# Patient Record
Sex: FEMALE | Race: WHITE | NOT HISPANIC OR LATINO | Employment: FULL TIME | URBAN - METROPOLITAN AREA
[De-identification: names, ages, dates, MRNs, and addresses within clinical notes are randomized per-mention and may not be internally consistent; named-entity substitution may affect disease eponyms.]

---

## 2017-03-06 ENCOUNTER — TRANSCRIBE ORDERS (OUTPATIENT)
Dept: MAMMOGRAPHY | Facility: HOSPITAL | Age: 47
End: 2017-03-06

## 2017-03-06 DIAGNOSIS — Z12.31 VISIT FOR SCREENING MAMMOGRAM: Primary | ICD-10-CM

## 2017-03-15 ENCOUNTER — TRANSCRIBE ORDERS (OUTPATIENT)
Dept: ADMINISTRATIVE | Facility: HOSPITAL | Age: 47
End: 2017-03-15

## 2017-03-15 DIAGNOSIS — R10.32 LLQ PAIN: Primary | ICD-10-CM

## 2017-03-28 ENCOUNTER — APPOINTMENT (OUTPATIENT)
Dept: MAMMOGRAPHY | Facility: HOSPITAL | Age: 47
End: 2017-03-28
Attending: OBSTETRICS & GYNECOLOGY

## 2017-04-07 ENCOUNTER — APPOINTMENT (OUTPATIENT)
Dept: CT IMAGING | Facility: HOSPITAL | Age: 47
End: 2017-04-07
Attending: INTERNAL MEDICINE

## 2017-04-21 ENCOUNTER — APPOINTMENT (OUTPATIENT)
Dept: CT IMAGING | Facility: HOSPITAL | Age: 47
End: 2017-04-21
Attending: INTERNAL MEDICINE

## 2017-05-12 ENCOUNTER — HOSPITAL ENCOUNTER (OUTPATIENT)
Dept: CT IMAGING | Facility: HOSPITAL | Age: 47
Discharge: HOME | End: 2017-05-12
Attending: INTERNAL MEDICINE | Admitting: INTERNAL MEDICINE

## 2017-05-12 DIAGNOSIS — R10.32 LLQ PAIN: ICD-10-CM

## 2017-05-12 PROCEDURE — 0 IOPAMIDOL 61 % SOLUTION: Performed by: INTERNAL MEDICINE

## 2017-05-12 PROCEDURE — 74178 CT ABD&PLV WO CNTR FLWD CNTR: CPT

## 2017-05-12 RX ADMIN — IOPAMIDOL 75 ML: 612 INJECTION, SOLUTION INTRAVENOUS at 15:25

## 2017-05-12 RX ADMIN — BARIUM SULFATE 450 ML: 21 SUSPENSION ORAL at 14:00

## 2017-12-14 ENCOUNTER — TRANSCRIBE ORDERS (OUTPATIENT)
Dept: ADMINISTRATIVE | Facility: HOSPITAL | Age: 47
End: 2017-12-14

## 2017-12-14 DIAGNOSIS — G43.119 INTRACTABLE MIGRAINE WITH AURA WITHOUT STATUS MIGRAINOSUS: Primary | ICD-10-CM

## 2018-01-16 ENCOUNTER — HOSPITAL ENCOUNTER (OUTPATIENT)
Dept: MRI IMAGING | Facility: HOSPITAL | Age: 48
Discharge: HOME | End: 2018-01-16
Attending: INTERNAL MEDICINE | Admitting: INTERNAL MEDICINE

## 2018-01-16 DIAGNOSIS — G43.119 INTRACTABLE MIGRAINE WITH AURA WITHOUT STATUS MIGRAINOSUS: ICD-10-CM

## 2018-01-16 PROCEDURE — 70551 MRI BRAIN STEM W/O DYE: CPT

## 2018-03-29 ENCOUNTER — TRANSCRIBE ORDERS (OUTPATIENT)
Dept: LAB | Facility: HOSPITAL | Age: 48
End: 2018-03-29

## 2018-03-29 ENCOUNTER — LAB (OUTPATIENT)
Dept: LAB | Facility: HOSPITAL | Age: 48
End: 2018-03-29
Attending: INTERNAL MEDICINE

## 2018-03-29 DIAGNOSIS — R07.0 THROAT PAIN: Primary | ICD-10-CM

## 2018-03-29 DIAGNOSIS — R07.0 THROAT PAIN: ICD-10-CM

## 2018-03-29 LAB
FLUAV SUBTYP SPEC NAA+PROBE: NOT DETECTED
FLUBV RNA ISLT QL NAA+PROBE: NOT DETECTED
S PYO AG THROAT QL: NEGATIVE

## 2018-03-29 PROCEDURE — 87880 STREP A ASSAY W/OPTIC: CPT

## 2018-03-29 PROCEDURE — 87070 CULTURE OTHR SPECIMN AEROBIC: CPT

## 2018-03-29 PROCEDURE — 87502 INFLUENZA DNA AMP PROBE: CPT

## 2018-03-31 LAB
BACTERIA SPEC AEROBE CULT: NORMAL

## 2018-05-03 ENCOUNTER — APPOINTMENT (OUTPATIENT)
Dept: PREADMISSION TESTING | Facility: HOSPITAL | Age: 48
End: 2018-05-03

## 2018-05-03 ENCOUNTER — LAB (OUTPATIENT)
Dept: LAB | Facility: HOSPITAL | Age: 48
End: 2018-05-03

## 2018-05-03 VITALS — BODY MASS INDEX: 35.08 KG/M2 | HEIGHT: 64 IN | WEIGHT: 205.47 LBS

## 2018-05-03 LAB
BASOPHILS # BLD AUTO: 0.07 10*3/MM3 (ref 0–0.2)
BASOPHILS NFR BLD AUTO: 0.8 % (ref 0–1)
DEPRECATED RDW RBC AUTO: 45.7 FL (ref 37–54)
EOSINOPHIL # BLD AUTO: 0.33 10*3/MM3 (ref 0–0.3)
EOSINOPHIL NFR BLD AUTO: 3.8 % (ref 0–3)
ERYTHROCYTE [DISTWIDTH] IN BLOOD BY AUTOMATED COUNT: 13.7 % (ref 11.3–14.5)
HCT VFR BLD AUTO: 42.1 % (ref 34.5–44)
HGB BLD-MCNC: 14.1 G/DL (ref 11.5–15.5)
IMM GRANULOCYTES # BLD: 0.02 10*3/MM3 (ref 0–0.03)
IMM GRANULOCYTES NFR BLD: 0.2 % (ref 0–0.6)
LYMPHOCYTES # BLD AUTO: 2.29 10*3/MM3 (ref 0.6–4.8)
LYMPHOCYTES NFR BLD AUTO: 26.6 % (ref 24–44)
MCH RBC QN AUTO: 30.8 PG (ref 27–31)
MCHC RBC AUTO-ENTMCNC: 33.5 G/DL (ref 32–36)
MCV RBC AUTO: 91.9 FL (ref 80–99)
MONOCYTES # BLD AUTO: 0.53 10*3/MM3 (ref 0–1)
MONOCYTES NFR BLD AUTO: 6.1 % (ref 0–12)
NEUTROPHILS # BLD AUTO: 5.4 10*3/MM3 (ref 1.5–8.3)
NEUTROPHILS NFR BLD AUTO: 62.7 % (ref 41–71)
PLATELET # BLD AUTO: 290 10*3/MM3 (ref 150–450)
PMV BLD AUTO: 11.1 FL (ref 6–12)
RBC # BLD AUTO: 4.58 10*6/MM3 (ref 3.89–5.14)
WBC NRBC COR # BLD: 8.62 10*3/MM3 (ref 3.5–10.8)

## 2018-05-03 PROCEDURE — 36415 COLL VENOUS BLD VENIPUNCTURE: CPT

## 2018-05-03 PROCEDURE — 85025 COMPLETE CBC W/AUTO DIFF WBC: CPT

## 2018-05-03 RX ORDER — SUMATRIPTAN 100 MG/1
100 TABLET, FILM COATED ORAL ONCE AS NEEDED
COMMUNITY

## 2018-05-03 RX ORDER — METOPROLOL SUCCINATE 100 MG/1
100 TABLET, EXTENDED RELEASE ORAL DAILY
Status: ON HOLD | COMMUNITY
End: 2018-05-08

## 2018-05-03 NOTE — DISCHARGE INSTRUCTIONS
The following information and instructions were given:    NPO after MN except sips of water with routine prescribed medication (except blood thinner, diabetes, or weight reducing medication) unless otherwise instructed by your physician.  Do not eat, drink, smoke or chew gum after midnight the night before surgery. This also includes no mints.    DO NOT shave for two days before your procedure.  Do not wear makeup.      DO NOT wear fingernail polish (gel/regular) and/or acrylic/artificial nails on the day of surgery.   If a patient had recent manicure and would rather not remove polish or artificial nails, then the minimum requirement is that the polish/artificial nails must be removed from the middle finger on each hand.      If patient is having surgery/procedure on an upper extremity, then the patient was instructed that fingernail polish/artificial fingernails must be removed for surgery.  NO EXCEPTIONS.      If patient is having surgery/procedure on a lower extremity, then the patient was instructed that toenail polish on both extremities must be removed for surgery.  NO EXCEPTIONS.    Remove all jewelry (advised to go to jeweler if unable to remove).  Jewelry, especially rings, can no longer be taped for surgery.    Leave anything you consider valuable at home.    Leave your suitcase in the car until after your surgery.    Bring the following with you (if applicable)       -picture ID and insurance cards       -Co-pay/deductible required by insurance       -Medications in the original bottles (not a list) including all over-the-counter  medications if not brought to PAT       -Copy of advance directive, living will or power of  documents if not  brought to [facility]       -CPAP or BIPAP mask and tubing (do not bring machine)       -Skin prep instructions sheet       -PAT Pass    Education booklet, brochure, handout or binder given to patient.    Pain Control After Surgery handout given to  patient.    Respirex use (handout given to patient) and pneumonia prevention.    Signs and Symptoms of infection discussed.    DVT Prevention education given.  Stressing the importance of ambulation.    Patient to apply Chlorhexadine wipes to surgical area (as instructed) the night before procedure and the AM of procedure.

## 2018-05-07 ENCOUNTER — ANESTHESIA EVENT (OUTPATIENT)
Dept: PERIOP | Facility: HOSPITAL | Age: 48
End: 2018-05-07

## 2018-05-08 ENCOUNTER — ANESTHESIA (OUTPATIENT)
Dept: PERIOP | Facility: HOSPITAL | Age: 48
End: 2018-05-08

## 2018-05-08 ENCOUNTER — HOSPITAL ENCOUNTER (OUTPATIENT)
Facility: HOSPITAL | Age: 48
Setting detail: HOSPITAL OUTPATIENT SURGERY
Discharge: HOME | End: 2018-05-08
Attending: PLASTIC SURGERY | Admitting: PLASTIC SURGERY

## 2018-05-08 VITALS
TEMPERATURE: 97.8 F | SYSTOLIC BLOOD PRESSURE: 140 MMHG | DIASTOLIC BLOOD PRESSURE: 88 MMHG | HEART RATE: 56 BPM | RESPIRATION RATE: 18 BRPM | OXYGEN SATURATION: 97 %

## 2018-05-08 DIAGNOSIS — T85.44XA BREAST IMPLANT CAPSULAR CONTRACTURE: ICD-10-CM

## 2018-05-08 LAB
B-HCG UR QL: NEGATIVE
INTERNAL NEGATIVE CONTROL: NORMAL
INTERNAL POSITIVE CONTROL: REACTIVE
Lab: NORMAL

## 2018-05-08 PROCEDURE — 25010000002 FENTANYL CITRATE (PF) 100 MCG/2ML SOLUTION: Performed by: NURSE ANESTHETIST, CERTIFIED REGISTERED

## 2018-05-08 PROCEDURE — 25010000002 ONDANSETRON PER 1 MG: Performed by: NURSE ANESTHETIST, CERTIFIED REGISTERED

## 2018-05-08 PROCEDURE — 25010000002 PROPOFOL 10 MG/ML EMULSION: Performed by: NURSE ANESTHETIST, CERTIFIED REGISTERED

## 2018-05-08 PROCEDURE — 25010000003 CEFAZOLIN IN DEXTROSE 2-4 GM/100ML-% SOLUTION: Performed by: PLASTIC SURGERY

## 2018-05-08 PROCEDURE — 25010000002 DEXAMETHASONE PER 1 MG: Performed by: NURSE ANESTHETIST, CERTIFIED REGISTERED

## 2018-05-08 PROCEDURE — 25010000002 PROPOFOL 1000 MG/ML EMULSION: Performed by: NURSE ANESTHETIST, CERTIFIED REGISTERED

## 2018-05-08 PROCEDURE — 88305 TISSUE EXAM BY PATHOLOGIST: CPT | Performed by: PLASTIC SURGERY

## 2018-05-08 PROCEDURE — 25010000002 NEOSTIGMINE 10 MG/10ML SOLUTION: Performed by: NURSE ANESTHETIST, CERTIFIED REGISTERED

## 2018-05-08 PROCEDURE — 25010000002 HYDROMORPHONE PER 4 MG: Performed by: NURSE ANESTHETIST, CERTIFIED REGISTERED

## 2018-05-08 RX ORDER — MAGNESIUM HYDROXIDE 1200 MG/15ML
LIQUID ORAL AS NEEDED
Status: DISCONTINUED | OUTPATIENT
Start: 2018-05-08 | End: 2018-05-08 | Stop reason: HOSPADM

## 2018-05-08 RX ORDER — GINSENG 100 MG
CAPSULE ORAL AS NEEDED
Status: DISCONTINUED | OUTPATIENT
Start: 2018-05-08 | End: 2018-05-08 | Stop reason: HOSPADM

## 2018-05-08 RX ORDER — FENTANYL CITRATE 50 UG/ML
INJECTION, SOLUTION INTRAMUSCULAR; INTRAVENOUS AS NEEDED
Status: DISCONTINUED | OUTPATIENT
Start: 2018-05-08 | End: 2018-05-08 | Stop reason: SURG

## 2018-05-08 RX ORDER — NARATRIPTAN 1 MG/1
1 TABLET ORAL ONCE AS NEEDED
COMMUNITY

## 2018-05-08 RX ORDER — NEOSTIGMINE METHYLSULFATE 1 MG/ML
INJECTION, SOLUTION INTRAVENOUS AS NEEDED
Status: DISCONTINUED | OUTPATIENT
Start: 2018-05-08 | End: 2018-05-08 | Stop reason: SURG

## 2018-05-08 RX ORDER — PROMETHAZINE HYDROCHLORIDE 25 MG/1
25 SUPPOSITORY RECTAL ONCE AS NEEDED
Status: DISCONTINUED | OUTPATIENT
Start: 2018-05-08 | End: 2018-05-09 | Stop reason: HOSPADM

## 2018-05-08 RX ORDER — GLYCOPYRROLATE 0.2 MG/ML
INJECTION INTRAMUSCULAR; INTRAVENOUS AS NEEDED
Status: DISCONTINUED | OUTPATIENT
Start: 2018-05-08 | End: 2018-05-08 | Stop reason: SURG

## 2018-05-08 RX ORDER — SODIUM CHLORIDE, SODIUM LACTATE, POTASSIUM CHLORIDE, CALCIUM CHLORIDE 600; 310; 30; 20 MG/100ML; MG/100ML; MG/100ML; MG/100ML
9 INJECTION, SOLUTION INTRAVENOUS CONTINUOUS
Status: DISCONTINUED | OUTPATIENT
Start: 2018-05-08 | End: 2018-05-09 | Stop reason: HOSPADM

## 2018-05-08 RX ORDER — ROCURONIUM BROMIDE 10 MG/ML
INJECTION, SOLUTION INTRAVENOUS AS NEEDED
Status: DISCONTINUED | OUTPATIENT
Start: 2018-05-08 | End: 2018-05-08 | Stop reason: SURG

## 2018-05-08 RX ORDER — PROMETHAZINE HYDROCHLORIDE 12.5 MG/1
12.5 TABLET ORAL EVERY 6 HOURS PRN
Qty: 15 TABLET | Refills: 0 | Status: SHIPPED | OUTPATIENT
Start: 2018-05-08 | End: 2019-12-02

## 2018-05-08 RX ORDER — ONDANSETRON 2 MG/ML
4 INJECTION INTRAMUSCULAR; INTRAVENOUS ONCE AS NEEDED
Status: DISCONTINUED | OUTPATIENT
Start: 2018-05-08 | End: 2018-05-09 | Stop reason: HOSPADM

## 2018-05-08 RX ORDER — ONDANSETRON 2 MG/ML
INJECTION INTRAMUSCULAR; INTRAVENOUS AS NEEDED
Status: DISCONTINUED | OUTPATIENT
Start: 2018-05-08 | End: 2018-05-08 | Stop reason: SURG

## 2018-05-08 RX ORDER — LIDOCAINE HYDROCHLORIDE 10 MG/ML
0.5 INJECTION, SOLUTION EPIDURAL; INFILTRATION; INTRACAUDAL; PERINEURAL ONCE AS NEEDED
Status: COMPLETED | OUTPATIENT
Start: 2018-05-08 | End: 2018-05-08

## 2018-05-08 RX ORDER — TOPIRAMATE 100 MG/1
100 TABLET, FILM COATED ORAL NIGHTLY
COMMUNITY

## 2018-05-08 RX ORDER — FENTANYL CITRATE 50 UG/ML
50 INJECTION, SOLUTION INTRAMUSCULAR; INTRAVENOUS
Status: DISCONTINUED | OUTPATIENT
Start: 2018-05-08 | End: 2018-05-09 | Stop reason: HOSPADM

## 2018-05-08 RX ORDER — PROMETHAZINE HYDROCHLORIDE 25 MG/ML
12.5 INJECTION, SOLUTION INTRAMUSCULAR; INTRAVENOUS ONCE AS NEEDED
Status: DISCONTINUED | OUTPATIENT
Start: 2018-05-08 | End: 2018-05-09 | Stop reason: HOSPADM

## 2018-05-08 RX ORDER — CEFAZOLIN SODIUM 2 G/100ML
2 INJECTION, SOLUTION INTRAVENOUS ONCE
Status: COMPLETED | OUTPATIENT
Start: 2018-05-08 | End: 2018-05-08

## 2018-05-08 RX ORDER — DEXAMETHASONE SODIUM PHOSPHATE 4 MG/ML
INJECTION, SOLUTION INTRA-ARTICULAR; INTRALESIONAL; INTRAMUSCULAR; INTRAVENOUS; SOFT TISSUE AS NEEDED
Status: DISCONTINUED | OUTPATIENT
Start: 2018-05-08 | End: 2018-05-08 | Stop reason: SURG

## 2018-05-08 RX ORDER — SODIUM CHLORIDE 0.9 % (FLUSH) 0.9 %
1-10 SYRINGE (ML) INJECTION AS NEEDED
Status: DISCONTINUED | OUTPATIENT
Start: 2018-05-08 | End: 2018-05-08 | Stop reason: HOSPADM

## 2018-05-08 RX ORDER — LIDOCAINE HYDROCHLORIDE AND EPINEPHRINE 5; 5 MG/ML; UG/ML
INJECTION, SOLUTION INFILTRATION; PERINEURAL AS NEEDED
Status: DISCONTINUED | OUTPATIENT
Start: 2018-05-08 | End: 2018-05-08 | Stop reason: HOSPADM

## 2018-05-08 RX ORDER — SCOLOPAMINE TRANSDERMAL SYSTEM 1 MG/1
1 PATCH, EXTENDED RELEASE TRANSDERMAL
Status: DISCONTINUED | OUTPATIENT
Start: 2018-05-08 | End: 2018-05-08 | Stop reason: HOSPADM

## 2018-05-08 RX ORDER — OXYCODONE HYDROCHLORIDE AND ACETAMINOPHEN 5; 325 MG/1; MG/1
1 TABLET ORAL EVERY 4 HOURS PRN
Qty: 20 TABLET | Refills: 0 | Status: SHIPPED | OUTPATIENT
Start: 2018-05-08 | End: 2019-10-09

## 2018-05-08 RX ORDER — PROMETHAZINE HYDROCHLORIDE 25 MG/1
25 TABLET ORAL ONCE AS NEEDED
Status: DISCONTINUED | OUTPATIENT
Start: 2018-05-08 | End: 2018-05-09 | Stop reason: HOSPADM

## 2018-05-08 RX ORDER — LIDOCAINE HYDROCHLORIDE 10 MG/ML
INJECTION, SOLUTION EPIDURAL; INFILTRATION; INTRACAUDAL; PERINEURAL AS NEEDED
Status: DISCONTINUED | OUTPATIENT
Start: 2018-05-08 | End: 2018-05-08 | Stop reason: SURG

## 2018-05-08 RX ORDER — OXYCODONE HYDROCHLORIDE AND ACETAMINOPHEN 5; 325 MG/1; MG/1
1 TABLET ORAL ONCE AS NEEDED
Status: COMPLETED | OUTPATIENT
Start: 2018-05-08 | End: 2018-05-08

## 2018-05-08 RX ORDER — FAMOTIDINE 20 MG/1
20 TABLET, FILM COATED ORAL ONCE
Status: COMPLETED | OUTPATIENT
Start: 2018-05-08 | End: 2018-05-08

## 2018-05-08 RX ORDER — ATRACURIUM BESYLATE 10 MG/ML
INJECTION, SOLUTION INTRAVENOUS AS NEEDED
Status: DISCONTINUED | OUTPATIENT
Start: 2018-05-08 | End: 2018-05-08 | Stop reason: SURG

## 2018-05-08 RX ORDER — TIZANIDINE 4 MG/1
2 TABLET ORAL NIGHTLY PRN
COMMUNITY
End: 2019-10-09

## 2018-05-08 RX ORDER — HYDROMORPHONE HYDROCHLORIDE 1 MG/ML
0.5 INJECTION, SOLUTION INTRAMUSCULAR; INTRAVENOUS; SUBCUTANEOUS
Status: DISCONTINUED | OUTPATIENT
Start: 2018-05-08 | End: 2018-05-09 | Stop reason: HOSPADM

## 2018-05-08 RX ORDER — PROPOFOL 10 MG/ML
VIAL (ML) INTRAVENOUS AS NEEDED
Status: DISCONTINUED | OUTPATIENT
Start: 2018-05-08 | End: 2018-05-08 | Stop reason: SURG

## 2018-05-08 RX ADMIN — DEXAMETHASONE SODIUM PHOSPHATE 8 MG: 4 INJECTION, SOLUTION INTRAMUSCULAR; INTRAVENOUS at 07:55

## 2018-05-08 RX ADMIN — FAMOTIDINE 20 MG: 20 TABLET ORAL at 06:42

## 2018-05-08 RX ADMIN — SCOPALAMINE 1 PATCH: 1 PATCH, EXTENDED RELEASE TRANSDERMAL at 06:42

## 2018-05-08 RX ADMIN — PROPOFOL 160 MG: 10 INJECTION, EMULSION INTRAVENOUS at 07:45

## 2018-05-08 RX ADMIN — ROCURONIUM BROMIDE 35 MG: 10 SOLUTION INTRAVENOUS at 07:45

## 2018-05-08 RX ADMIN — OXYCODONE HYDROCHLORIDE AND ACETAMINOPHEN 1 TABLET: 5; 325 TABLET ORAL at 11:03

## 2018-05-08 RX ADMIN — FENTANYL CITRATE 50 MCG: 50 INJECTION, SOLUTION INTRAMUSCULAR; INTRAVENOUS at 10:03

## 2018-05-08 RX ADMIN — PROPOFOL 50 MG: 10 INJECTION, EMULSION INTRAVENOUS at 07:53

## 2018-05-08 RX ADMIN — NEOSTIGMINE METHYLSULFATE 2.5 MG: 1 INJECTION, SOLUTION INTRAVENOUS at 09:59

## 2018-05-08 RX ADMIN — GLYCOPYRROLATE 0.4 MG: 0.2 INJECTION, SOLUTION INTRAMUSCULAR; INTRAVENOUS at 09:59

## 2018-05-08 RX ADMIN — HYDROMORPHONE HYDROCHLORIDE 0.5 MG: 1 INJECTION, SOLUTION INTRAMUSCULAR; INTRAVENOUS; SUBCUTANEOUS at 11:07

## 2018-05-08 RX ADMIN — FENTANYL CITRATE 50 MCG: 50 INJECTION, SOLUTION INTRAMUSCULAR; INTRAVENOUS at 07:45

## 2018-05-08 RX ADMIN — CEFAZOLIN SODIUM 2 G: 2 INJECTION, SOLUTION INTRAVENOUS at 07:38

## 2018-05-08 RX ADMIN — SODIUM CHLORIDE, POTASSIUM CHLORIDE, SODIUM LACTATE AND CALCIUM CHLORIDE 9 ML/HR: 600; 310; 30; 20 INJECTION, SOLUTION INTRAVENOUS at 06:40

## 2018-05-08 RX ADMIN — LIDOCAINE HYDROCHLORIDE 0.5 ML: 10 INJECTION, SOLUTION EPIDURAL; INFILTRATION; INTRACAUDAL; PERINEURAL at 06:40

## 2018-05-08 RX ADMIN — ONDANSETRON 4 MG: 2 INJECTION INTRAMUSCULAR; INTRAVENOUS at 09:59

## 2018-05-08 RX ADMIN — PROPOFOL 25 MCG/KG/MIN: 10 INJECTION, EMULSION INTRAVENOUS at 07:53

## 2018-05-08 RX ADMIN — ATRACURIUM BESYLATE 10 MG: 10 INJECTION, SOLUTION INTRAVENOUS at 09:26

## 2018-05-08 RX ADMIN — LIDOCAINE HYDROCHLORIDE 50 MG: 10 INJECTION, SOLUTION EPIDURAL; INFILTRATION; INTRACAUDAL; PERINEURAL at 07:45

## 2018-05-08 RX ADMIN — HYDROMORPHONE HYDROCHLORIDE 0.5 MG: 1 INJECTION, SOLUTION INTRAMUSCULAR; INTRAVENOUS; SUBCUTANEOUS at 10:37

## 2018-05-08 NOTE — ANESTHESIA PREPROCEDURE EVALUATION
Anesthesia Evaluation     Patient summary reviewed and Nursing notes reviewed                Airway   Mallampati: II  Dental      Pulmonary - negative pulmonary ROS   Cardiovascular - negative cardio ROS        Neuro/Psych- negative ROS  GI/Hepatic/Renal/Endo - negative ROS     Musculoskeletal (-) negative ROS    Abdominal    Substance History - negative use     OB/GYN negative ob/gyn ROS         Other                        Anesthesia Plan    ASA 2     general and regional     intravenous induction     Plan discussed with CRNA.

## 2018-05-08 NOTE — BRIEF OP NOTE
BREAST CAPSULOTOMY, IMPLANT REVISION  Progress Note    Ondina Loo  5/8/2018    Pre-op Diagnosis:   Capsular contracture bilateral breasts s/p bilateral breast augmentation       Post-Op Diagnosis Codes:     * Other disorders of breast [611]    Procedure/CPT® Codes:  MN REMOVAL OF BREAST CAPSULE [49117]    Procedure(s):  BILATERAL CAPSULECTOMY AND BREAST IMPLANT REMOVAL    Surgeon(s):  Eileen Lares MD    Anesthesia: General     Staff:   Circulator: Deja Ibrahim RN  Scrub Person: Anderson Lieberman  Nursing Assistant: Thiago Roberts  Assistant: SANCHEZ Mcdaniel    Estimated Blood Loss: 5 mL    Urine Voided: * No values recorded between 5/8/2018  7:38 AM and 5/8/2018  9:55 AM *    Specimens:                ID Type Source Tests Collected by Time   A : LEFT BREAST IMPLANT CAPSULE Tissue Breast, Left TISSUE PATHOLOGY EXAM Eileen Lares MD 5/8/2018 0835   B : RIGHT BREAST IMPLANT CAPSULE Tissue Breast, Right TISSUE PATHOLOGY EXAM Eileen Lares MD 5/8/2018 0901         Drains:  2 Jose    Complications: None      Eileen Lares MD     Date: 5/8/2018  Time: 10:04 AM

## 2018-05-08 NOTE — INTERVAL H&P NOTE
Pre-Op H&P (See Recent Office Note Attached for Full H&P)    History and physical note from office reviewed and updated with the following, otherwise there are no changes in H&P:      Review of Systems:  General ROS:  no fever, chills, rashes, No change since last office visit  Cardiovascular ROS: no chest pain or dyspnea on exertion  Respiratory ROS: no cough, shortness of breath, or wheezing    Immunization History:   Influenza:  Yes   Pneumococcal:  No   Tetanus:  Up-to-date    Meds:  naratriptan (AMERGE) 1 MG tablet   tiZANidine (ZANAFLEX) 4 MG tablet   topiramate (TOPAMAX) 100 MG tablet   SUMAtriptan (IMITREX) 100 MG tablet     No Known Allergies  Latex: no known allergy  Contrast Dye:  no known allergy      Vital Signs:  /87 (BP Location: Right arm, Patient Position: Lying)   Pulse 70   Temp 97.1 °F (36.2 °C) (Temporal Artery )   Resp 18   LMP  (LMP Unknown) Comment: very irregular/ perimenopausal  SpO2 98%   Breastfeeding? No     Physical Exam:    CV:  S1S2 regular rate and rhythm, no murmur               Resp:  Clear to auscultation; respirations regular, even and unlabored    Results Review:    I reviewed the patient's new clinical results.    Cancer Staging (if applicable)  Cancer Patient: __ yes __no __unknown; If yes, clinical stage T:__ N:__M:__, stage group or __N/A    Assessment/Plan:   Breast Asymmetry,  Breast Capsular contracture s/p bilateral breast augmentation with silicone implants approx. 9 years ago  /  BILATERAL CAPSULECTOMY, BILATERAL IMPLANT REMOVAL      SANCHEZ Mcdaniel  5/8/2018   6:53 AM

## 2018-05-08 NOTE — ANESTHESIA POSTPROCEDURE EVALUATION
Patient: Ondina Loo    Procedure Summary     Date:  05/08/18 Room / Location:   [facility]    Anesthesia Start:  0738 Anesthesia Stop:      Procedure:  BILATERAL CAPSULECTOMY AND BREAST IMPLANT REMOVAL (Bilateral Breast) Diagnosis:  Other disorders of breast    Surgeon:  Eileen Lares MD Provider:  Pratik Luo MD    Anesthesia Type:  general, regional ASA Status:  2          Anesthesia Type: general, regional  Last vitals  /65  122/87 (05/08/18 0635)   Temp 97  97.1 °F (36.2 °C) (05/08/18 0635)   Pulse 70  70 (05/08/18 0635)   Resp 16  18 (05/08/18 0635)     SpO2 97  98 % (05/08/18 0635)     Post Anesthesia Care and Evaluation    Patient location during evaluation: PACU  Patient participation: complete - patient participated  Level of consciousness: awake and alert  Pain score: 0  Pain management: adequate  Airway patency: patent  Anesthetic complications: No anesthetic complications  PONV Status: none  Cardiovascular status: hemodynamically stable and acceptable  Respiratory status: nonlabored ventilation, acceptable and nasal cannula  Hydration status: acceptable

## 2018-05-08 NOTE — ANESTHESIA PROCEDURE NOTES
Airway  Urgency: elective    Airway not difficult    General Information and Staff    Patient location during procedure: OR    Indications and Patient Condition  Indications for airway management: airway protection    Preoxygenated: yes  MILS not maintained throughout  Mask difficulty assessment: 1 - vent by mask    Final Airway Details  Final airway type: endotracheal airway      Successful airway: ETT  Cuffed: yes   Successful intubation technique: direct laryngoscopy  Endotracheal tube insertion site: oral  Blade: Nuria  Blade size: #3  ETT size: 7.5 mm  Cormack-Lehane Classification: grade I - full view of glottis  Placement verified by: chest auscultation and capnometry   Measured from: lips  ETT to lips (cm): 20  Number of attempts at approach: 1    Additional Comments  Negative epigastric sounds, Breath sound equal bilaterally with symmetric chest rise and fall

## 2018-05-08 NOTE — OP NOTE
Operative Report    Preoperative Diagnosis: Capsular contracture bilateral breasts s/p bilateral breast augmentation    Postoperative Diagnosis: Same     Procedure: Bilateral capsulectomy and breast implant removal       Surgeon: Eileen Jones MD     Assistant: Natalia Rosenthal PA-C    Anesthesia: General endotracheal    Estimated Blood Loss:  5 mL                Complications:  None    Procedure Details:   The patient was identified and taken to the OR where she was placed in a supine position and general anesthesia was induced. Bilateral sequential compression devices were placed prior to induction of anesthesia and care was taken to position her extremities. The patient was then prepped and draped in a sterile fashion. Attention was first directed to the left breast. An incision was made in the region of the old scar at the inframammary crease. Dissection was carried out using Bovie electrocautery down to the level of the implant capsule. Dissection was then carried out around the capsule,  it from the overlying breast tissue and underlying muscular fascia. The implant and capsule were removed in their entirety. The implant was noted to be a silicone implant Allergan style 15 size 265 mL and was intact. Hemostasis was achieved with Bovie electrocautery. Attention was then directed to the contralateral side. A similar procedure as described above was performed at this time. The implant on the right side was also noted to be an Allergan style 15 size 265 mL silicone implant which was intact. The capsule on the right side was notably thinner than the left. After obtaining hemostasis, both pockets were irrigated with antibiotic irrigation. Two to three sutures were placed between the pectoralis fascia and the breast tissue to maintain a more elevated position for the breast. A #10 Jose drain was placed on each side. Closure then proceeded in two layers. Antibiotic ointment and dry dressings were applied  followed by a surgical support bra. Drains were placed to bulb suction. The patient tolerated the procedure well.

## 2018-05-09 LAB
CYTO UR: NORMAL
LAB AP CASE REPORT: NORMAL
LAB AP CLINICAL INFORMATION: NORMAL
Lab: NORMAL
PATH REPORT.FINAL DX SPEC: NORMAL
PATH REPORT.GROSS SPEC: NORMAL

## 2019-10-09 ENCOUNTER — OFFICE VISIT (OUTPATIENT)
Dept: ORTHOPEDIC SURGERY | Facility: CLINIC | Age: 49
End: 2019-10-09

## 2019-10-09 VITALS — BODY MASS INDEX: 24.92 KG/M2 | WEIGHT: 145.94 LBS | HEIGHT: 64 IN | OXYGEN SATURATION: 99 % | HEART RATE: 85 BPM

## 2019-10-09 DIAGNOSIS — S91.032A: ICD-10-CM

## 2019-10-09 DIAGNOSIS — S91.032A PUNCTURE WOUND OF LEFT ANKLE, INITIAL ENCOUNTER: ICD-10-CM

## 2019-10-09 DIAGNOSIS — M25.572 LEFT ANKLE PAIN, UNSPECIFIED CHRONICITY: Primary | ICD-10-CM

## 2019-10-09 PROCEDURE — 99203 OFFICE O/P NEW LOW 30 MIN: CPT | Performed by: ORTHOPAEDIC SURGERY

## 2019-10-09 RX ORDER — RIZATRIPTAN BENZOATE 10 MG/1
10 TABLET ORAL ONCE AS NEEDED
Refills: 1 | COMMUNITY
Start: 2019-09-17 | End: 2022-09-07

## 2019-10-09 RX ORDER — DAPTOMYCIN 50 MG/ML
INJECTION, POWDER, LYOPHILIZED, FOR SOLUTION INTRAVENOUS
COMMUNITY
End: 2019-10-11

## 2019-10-09 RX ORDER — TIZANIDINE 2 MG/1
1 TABLET ORAL NIGHTLY
Refills: 0 | COMMUNITY
Start: 2019-09-17

## 2019-10-09 RX ORDER — DOXYCYCLINE 100 MG/1
100 TABLET ORAL 2 TIMES DAILY
Refills: 0 | COMMUNITY
Start: 2019-10-08 | End: 2019-11-04 | Stop reason: SDUPTHER

## 2019-10-09 NOTE — PROGRESS NOTES
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  2 weeks ago she had a stingray puncture wound to the left medial malleolus.  She put it in hot water and the pain got better.  She did well until 5 days ago when she began to develop erythema at the puncture site.  It got worse and more painful.  She got daptomycin yesterday and is on doxycycline.  She is here for surgical consultation.  They have seen prior stingray injuries in their office that required surgical debridement.  She is not having any fever nor chills.  She does wear support stockings.  Dr. Townsend has been treating her.    Current Outpatient Medications on File Prior to Visit   Medication Sig Dispense Refill   • DAPTOmycin (CUBICIN) 500 MG injection Daily.     • doxycycline (ADOXA) 100 MG tablet Take 100 mg by mouth 2 (Two) Times a Day.  0   • naratriptan (AMERGE) 1 MG tablet Take 1 mg by mouth 1 (One) Time As Needed for Migraine.     • promethazine (PHENERGAN) 12.5 MG tablet Take 1 tablet by mouth Every 6 (Six) Hours As Needed for Nausea or Vomiting. 15 tablet 0   • rizatriptan (MAXALT) 10 MG tablet   1   • SUMAtriptan (IMITREX) 100 MG tablet Take 100 mg by mouth 1 (One) Time As Needed for Migraine.     • tiZANidine (ZANAFLEX) 2 MG tablet   0   • topiramate (TOPAMAX) 100 MG tablet Take 200 mg by mouth Daily.     • [DISCONTINUED] oxyCODONE-acetaminophen (PERCOCET) 5-325 MG per tablet Take 1 tablet by mouth Every 4 (Four) Hours As Needed for Moderate Pain  or Severe Pain . 20 tablet 0   • [DISCONTINUED] tiZANidine (ZANAFLEX) 4 MG tablet Take 2 mg by mouth At Night As Needed for Muscle Spasms.       No current facility-administered  "medications on file prior to visit.       No Known Allergies   Past Medical History:   Diagnosis Date   • Constipation     due to loop in bowel   • Migraines      Past Surgical History:   Procedure Laterality Date   • BREAST AUGMENTATION     • BREAST CAPSULOTOMY, IMPLANT REVISION Bilateral 5/8/2018    Procedure: BILATERAL CAPSULECTOMY AND BREAST IMPLANT REMOVAL;  Surgeon: Eileen Lares MD;  Location: [facility];  Service: Plastics   • OOPHORECTOMY Left      History reviewed. No pertinent family history.   Social History     Socioeconomic History   • Marital status:      Spouse name: Not on file   • Number of children: Not on file   • Years of education: Not on file   • Highest education level: Not on file   Tobacco Use   • Smoking status: Never Smoker   • Smokeless tobacco: Never Used   Substance and Sexual Activity   • Alcohol use: Yes     Comment: social   • Drug use: No   • Sexual activity: Defer        Review of Systems   Constitutional: Negative.    HENT: Negative.    Eyes: Negative.    Respiratory: Negative.    Cardiovascular: Negative.    Gastrointestinal: Negative.    Endocrine: Negative.    Genitourinary: Negative.    Musculoskeletal: Positive for joint swelling.   Skin: Negative.    Allergic/Immunologic: Negative.    Neurological: Negative.    Hematological: Negative.    Psychiatric/Behavioral: Negative.        The following portions of the patient's history were reviewed and updated as appropriate: allergies, current medications, past family history, past medical history, past social history, past surgical history and problem list.    Physical Exam:   Pulse 85   Ht 162.6 cm (64.02\")   Wt 66.2 kg (145 lb 15.1 oz)   SpO2 99%   BMI 25.04 kg/m²   GENERAL: Body habitus: normal weight for height    Lower extremity edema: Right: none; Left: trace    Varicose veins:  Right: none; Left: none    Gait: antalgic     Mental Status:  awake and alert; oriented to person, place, and time    Voice:  " clear  SKIN:  Lower extremity: warm and dry    Hair Growth(lower extremity):  Right:normal; Left:  normal  NAILS: Toenails: normal  HEENT: Head: Normocephalic, atraumatic,  without obvious abnormality.  eye: normal external eye, no icterus  ears:normal external ears  nose: normal external nose  pharynx: dental hygiene adequate  PULM:  Repiratory effort normal  CV:  Dorsalis Pedis:  Right: 2+; Left:2+    Posterior Tibial: Right:2+; Left:2+    Capillary Refill:  Brisk  MSK:  Hand:right handed and sensation intact      Tibia:  Right:  non tender; Left:  non tender      Ankle:  Right: non tender, ROM  normal and motor function  normal; Left:  Tender with erythema over the medial malleolus, local swelling, central puncture wound is not currently draining, no definite fluctuance but the bursa appears thickened, not tender over the tendons, not tender laterally, range of motion is full with pain at the extremes      Foot:  Right:  non tender; Left:  non tender      NEURO: Heel Walking:  Right:  unable to test; Left:  unable to test    Toe Walking:  Right:  unable to test; Left:  unable to test     Hopkins-Marleni 5.07 monofilament test: not evaluated    Lower extremity sensation: intact     Reflexes:  Biceps:  Right:  2+; Left:  2+           Quads:  Right:  2+; Left:  2+           Ankle:  Right:  2+; Left:  2+      Calf Atrophy:none    Motor Function: all 5/5         Medical Decision Making    Data Review:   ordered and reviewed x-rays today    Assessment and Plan/ Diagnosis/Treatment options:   1.Puncture wound of left ankle with complication, initial encounter  I have also seen stingray injuries before that needs surgical debridement.  Often there is a small retained cartilaginous foreign body.  Sometimes they entered tendon sheaths.  My concern here is that it is over the bone.  I recommend we do an MRI ASAP, continue the daptomycin, I will see her again on Friday.  If it does need debriding we will plan on doing this  Tuesday.  I think we need to proceed fairly rapidly, there is some risk for bone infection.  - MRI Ankle Left With & Without Contrast; Future            Radiology Ordered []  Radiology Reports Reviewed []      Radiology Images Reviewed []   Labs Reviewed []    Labs Ordered []   PCP Records Reviewed []    Provider Records Reviewed []    ER Records Reviewed []    Hospital Records Reviewed []    History Obtained From Family []    Phone conversation with Provider []    Records Requested []

## 2019-10-10 ENCOUNTER — HOSPITAL ENCOUNTER (OUTPATIENT)
Dept: MRI IMAGING | Facility: HOSPITAL | Age: 49
Discharge: HOME | End: 2019-10-10
Admitting: ORTHOPAEDIC SURGERY

## 2019-10-10 DIAGNOSIS — S91.032A: ICD-10-CM

## 2019-10-10 PROCEDURE — A9577 INJ MULTIHANCE: HCPCS | Performed by: ORTHOPAEDIC SURGERY

## 2019-10-10 PROCEDURE — 73723 MRI JOINT LWR EXTR W/O&W/DYE: CPT

## 2019-10-10 PROCEDURE — 0 GADOBENATE DIMEGLUMINE 529 MG/ML SOLUTION: Performed by: ORTHOPAEDIC SURGERY

## 2019-10-10 RX ADMIN — GADOBENATE DIMEGLUMINE 13 ML: 529 INJECTION, SOLUTION INTRAVENOUS at 18:20

## 2019-10-11 ENCOUNTER — APPOINTMENT (OUTPATIENT)
Dept: PREADMISSION TESTING | Facility: HOSPITAL | Age: 49
End: 2019-10-11

## 2019-10-11 ENCOUNTER — OFFICE VISIT (OUTPATIENT)
Dept: ORTHOPEDIC SURGERY | Facility: CLINIC | Age: 49
End: 2019-10-11

## 2019-10-11 VITALS — WEIGHT: 141.09 LBS | BODY MASS INDEX: 24.09 KG/M2 | HEIGHT: 64 IN | OXYGEN SATURATION: 98 % | HEART RATE: 75 BPM

## 2019-10-11 VITALS — BODY MASS INDEX: 24.88 KG/M2 | HEIGHT: 64 IN | WEIGHT: 145.72 LBS

## 2019-10-11 DIAGNOSIS — S91.032A: Primary | ICD-10-CM

## 2019-10-11 LAB
ANION GAP SERPL CALCULATED.3IONS-SCNC: 12 MMOL/L (ref 5–15)
BUN BLD-MCNC: 11 MG/DL (ref 6–20)
BUN/CREAT SERPL: 14.5 (ref 7–25)
CALCIUM SPEC-SCNC: 9.6 MG/DL (ref 8.6–10.5)
CHLORIDE SERPL-SCNC: 104 MMOL/L (ref 98–107)
CO2 SERPL-SCNC: 23 MMOL/L (ref 22–29)
CREAT BLD-MCNC: 0.76 MG/DL (ref 0.57–1)
CRP SERPL-MCNC: 0.09 MG/DL (ref 0–0.5)
DEPRECATED RDW RBC AUTO: 47.4 FL (ref 37–54)
ERYTHROCYTE [DISTWIDTH] IN BLOOD BY AUTOMATED COUNT: 13.2 % (ref 12.3–15.4)
ERYTHROCYTE [SEDIMENTATION RATE] IN BLOOD: 2 MM/HR (ref 0–20)
GFR SERPL CREATININE-BSD FRML MDRD: 82 ML/MIN/1.73
GLUCOSE BLD-MCNC: 103 MG/DL (ref 65–99)
HBA1C MFR BLD: 5.5 % (ref 4.8–5.6)
HCG SERPL QL: NEGATIVE
HCT VFR BLD AUTO: 43.8 % (ref 34–46.6)
HGB BLD-MCNC: 14.1 G/DL (ref 12–15.9)
MCH RBC QN AUTO: 31.1 PG (ref 26.6–33)
MCHC RBC AUTO-ENTMCNC: 32.2 G/DL (ref 31.5–35.7)
MCV RBC AUTO: 96.7 FL (ref 79–97)
PLATELET # BLD AUTO: 152 10*3/MM3 (ref 140–450)
PMV BLD AUTO: 11.1 FL (ref 6–12)
POTASSIUM BLD-SCNC: 4 MMOL/L (ref 3.5–5.2)
RBC # BLD AUTO: 4.53 10*6/MM3 (ref 3.77–5.28)
SODIUM BLD-SCNC: 139 MMOL/L (ref 136–145)
WBC NRBC COR # BLD: 6.63 10*3/MM3 (ref 3.4–10.8)

## 2019-10-11 PROCEDURE — 85027 COMPLETE CBC AUTOMATED: CPT | Performed by: ORTHOPAEDIC SURGERY

## 2019-10-11 PROCEDURE — 86140 C-REACTIVE PROTEIN: CPT | Performed by: ORTHOPAEDIC SURGERY

## 2019-10-11 PROCEDURE — 80048 BASIC METABOLIC PNL TOTAL CA: CPT | Performed by: ORTHOPAEDIC SURGERY

## 2019-10-11 PROCEDURE — 99213 OFFICE O/P EST LOW 20 MIN: CPT | Performed by: ORTHOPAEDIC SURGERY

## 2019-10-11 PROCEDURE — 36415 COLL VENOUS BLD VENIPUNCTURE: CPT

## 2019-10-11 PROCEDURE — 84703 CHORIONIC GONADOTROPIN ASSAY: CPT | Performed by: ORTHOPAEDIC SURGERY

## 2019-10-11 PROCEDURE — 85652 RBC SED RATE AUTOMATED: CPT | Performed by: ORTHOPAEDIC SURGERY

## 2019-10-11 PROCEDURE — 83036 HEMOGLOBIN GLYCOSYLATED A1C: CPT | Performed by: ORTHOPAEDIC SURGERY

## 2019-10-11 NOTE — DISCHARGE INSTRUCTIONS
The following information and instructions were given:    Do not eat, drink, smoke or chew gum after midnight the night before surgery. This also includes no mints.  Take all routine, prescribed medications including heart and blood pressure medicines with a sip of water unless otherwise instructed by your physician.   Do NOT take diabetic medication unless instructed by your physician.    If you were instructed to drink 20 ounces (or until full) of Gatorade the morning of surgery, the Gatorade must be completed 1 hour before arrival time on the day of surgery .  No RED Gatorade.      DO NOT shave for two days before your procedure.  Do not wear makeup.      DO NOT wear fingernail polish (gel/regular) and/or acrylic/artificial nails on the day of surgery.   If you have had a recent manicure and would rather not remove polish or artificial nails, then the minimum requirement is that the polish/artificial nails must be removed from the middle finger on each hand.      If you are having surgery/procedure on an upper extremity, then fingernail polish/artificial fingernails must be removed for surgery.  NO EXCEPTIONS.      If you are having surgery/procedure on a lower extremity, then toenail polish on both extremities must be removed for surgery.  NO EXCEPTIONS.    Remove all jewelry (advise to go to jeweler if unable to remove).  Jewelry, especially rings, can no longer be taped for surgery.    Leave anything you consider valuable at home.    Leave your suitcase in the car until after your surgery.    Bring the following with you the day of your procedure (when applicable)       -picture ID and insurance cards       -Co-pay/deductible required by insurance       -Medications in the original bottles (not a list) including all over-the-counter medications if not brought to PAT       -Copy of advance directive, living will or power of  documents if not brought to PAT       -CPAP or BIPAP mask and tubing (do not  bring machine)       -Skin prep instruction(s) sheet       -PAT Pass    Education booklet, brochure, handout or binder related to procedure given to patient.    When applicable, an ERAS booklet was given to patient.    Pain Control After Surgery handout given to patient.    Respirex use (handout given to patient) and pneumonia prevention education provided.    Signs and Symptoms of infection discussed.    DVT Prevention education given.  Stressing the importance of ambulation.    Please apply Chlorhexadine wipes to surgical area (if instructed) the night before procedure and the AM of procedure and document date/time of applications on skin prep instruction sheet.        Patient instructed to drink 20 ounces (or until full) of Gatorade and it needs to be completed 1 hour before given arrival time for procedure (NO RED Gatorade)    Patient verbalized understanding.    Patient to apply Chlorhexadine wipes  to surgical area (as instructed) the night before procedure and the AM of procedure. Wipes provided.

## 2019-10-11 NOTE — PROGRESS NOTES
ESTABLISHED PATIENT    Patient: Ondina Loo  : 1970    Primary Care Provider: Mariya Mercedes MD    Requesting Provider: As above    Follow-up of the Left Ankle (After MRI 10/10/2019)      History    Chief Complaint: Left ankle pain    History of Present Illness: She returns for follow-up of the MRI, her  is with her.  I looked at the MRI and the report.  It was done 10/10/2019.  They do not see any definite foreign body, but there is subcutaneous inflammation and fluid in the area of the puncture wound.  No obvious tendon or bone involvement.    Current Outpatient Medications on File Prior to Visit   Medication Sig Dispense Refill   • doxycycline (ADOXA) 100 MG tablet Take 100 mg by mouth 2 (Two) Times a Day.  0   • naratriptan (AMERGE) 1 MG tablet Take 1 mg by mouth 1 (One) Time As Needed for Migraine.     • promethazine (PHENERGAN) 12.5 MG tablet Take 1 tablet by mouth Every 6 (Six) Hours As Needed for Nausea or Vomiting. 15 tablet 0   • rizatriptan (MAXALT) 10 MG tablet   1   • SUMAtriptan (IMITREX) 100 MG tablet Take 100 mg by mouth 1 (One) Time As Needed for Migraine.     • tiZANidine (ZANAFLEX) 2 MG tablet   0   • topiramate (TOPAMAX) 100 MG tablet Take 200 mg by mouth Daily.     • [DISCONTINUED] DAPTOmycin (CUBICIN) 500 MG injection Daily.       Current Facility-Administered Medications on File Prior to Visit   Medication Dose Route Frequency Provider Last Rate Last Dose   • [COMPLETED] gadobenate dimeglumine (MULTIHANCE) injection 13 mL  13 mL Intravenous Once in imaging Jennifer Vidal MD   13 mL at 10/10/19 1820      No Known Allergies   Past Medical History:   Diagnosis Date   • Constipation     due to loop in bowel   • Migraines      Past Surgical History:   Procedure Laterality Date   • BREAST AUGMENTATION     • BREAST CAPSULOTOMY, IMPLANT REVISION Bilateral 2018    Procedure: BILATERAL CAPSULECTOMY AND BREAST IMPLANT REMOVAL;  Surgeon: Eileen Lares MD;  Location:   "[facility];  Service: Plastics   • OOPHORECTOMY Left      History reviewed. No pertinent family history.   Social History     Socioeconomic History   • Marital status:      Spouse name: Not on file   • Number of children: Not on file   • Years of education: Not on file   • Highest education level: Not on file   Tobacco Use   • Smoking status: Never Smoker   • Smokeless tobacco: Never Used   Substance and Sexual Activity   • Alcohol use: Yes     Comment: social   • Drug use: No   • Sexual activity: Defer        Review of Systems   Constitutional: Negative.    HENT: Negative.    Eyes: Negative.    Respiratory: Negative.    Cardiovascular: Negative.    Gastrointestinal: Negative.    Endocrine: Negative.    Genitourinary: Negative.    Musculoskeletal: Positive for arthralgias.   Skin: Negative.    Allergic/Immunologic: Negative.    Neurological: Negative.    Hematological: Negative.    Psychiatric/Behavioral: Negative.        The following portions of the patient's history were reviewed and updated as appropriate: allergies, current medications, past family history, past medical history, past social history, past surgical history and problem list.    Physical Exam:   Pulse 75   Ht 162.6 cm (64.02\")   Wt 64 kg (141 lb 1.5 oz)   LMP 09/19/2019   SpO2 98%   BMI 24.21 kg/m²   GENERAL: Gait: antalgic       MSK:  Ankle:  ; Left:  No significant change in the ring of redness around the puncture wound, no drainage as yet, still swollen, it does not appear to be in the joint, tendons are not involved, she does note some intermittent numbness in the toes        NEURO Sensation:  Intact to light touch, subjective intermittent numbness in the medial toes     Medical Decision Making    Data Review:   reviewed radiology images, reviewed radiology results and phone conversation with physician    Assessment/Plan/Diagnosis/Treatment Options:   1. Puncture wound of left ankle with complication, initial encounter  Even though the " MRI does not show a definite foreign body, the erythema and swelling have not gone away with IV antibiotics and oral antibiotics.  Based on past experience with these type of injuries, there is often a tiny piece of cartilaginous stingray edgar in the wound, and it causes a persistent problem.  I would recommend we I&D this.  By removing the granulation tissue we can often resolve the problem.  The goal would be to prevent a chronic smoldering wound.  If between now on Tuesday it suddenly resolves we can cancel surgery, but I would recommend I&D in this on Tuesday.  She is not septic, she does not have a fever I do not think we need to do it over the weekend.  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  Dr. Chance Herrera is following her antibiotics.  I texted him about the plan.  We discussed the risks including but not limited to: death, infection, neurovascular damage, strokes, heart attacks, blood clots, chronic pain, deformity, stiffness, need for  further surgery,  amputation, etc.  Questions asked and answered in detail.

## 2019-10-11 NOTE — PAT
Patient to apply Chlorhexadine wipes  to surgical area (as instructed) the night before procedure and the AM of procedure. Wipes provided.    Patient instructed to drink 20 ounces (or until full) of Gatorade and it needs to be completed 1 hour before given arrival time for procedure (NO RED Gatorade)    Patient verbalized understanding.

## 2019-10-14 RX ORDER — HYDROCODONE BITARTRATE AND ACETAMINOPHEN 7.5; 325 MG/1; MG/1
1-2 TABLET ORAL EVERY 6 HOURS PRN
Qty: 45 TABLET | Refills: 0 | Status: SHIPPED | OUTPATIENT
Start: 2019-10-14 | End: 2019-10-28

## 2019-10-14 RX ORDER — ONDANSETRON 4 MG/1
4 TABLET, FILM COATED ORAL EVERY 6 HOURS PRN
Qty: 30 TABLET | Refills: 0 | Status: SHIPPED | OUTPATIENT
Start: 2019-10-14 | End: 2019-10-28

## 2019-10-14 RX ORDER — OXYCODONE HYDROCHLORIDE AND ACETAMINOPHEN 5; 325 MG/1; MG/1
1-2 TABLET ORAL EVERY 6 HOURS PRN
Qty: 45 TABLET | Refills: 0 | Status: SHIPPED | OUTPATIENT
Start: 2019-10-14 | End: 2019-10-28

## 2019-10-14 NOTE — TELEPHONE ENCOUNTER
CALLED PATIENT TO ADVISE OF 12:00PM ARRIVAL TIME FOR SURGERY ON 10/15/19 WITH DR. DEWITT, NO ANSWER.  LEFT MESSAGE WITH DETAILS AND REQUEST TO RETURN MY CALL CONFIRMING RECEIPT OF MESSAGE.

## 2019-10-15 ENCOUNTER — ANESTHESIA (OUTPATIENT)
Dept: PERIOP | Facility: HOSPITAL | Age: 49
End: 2019-10-15

## 2019-10-15 ENCOUNTER — ANESTHESIA EVENT (OUTPATIENT)
Dept: PERIOP | Facility: HOSPITAL | Age: 49
End: 2019-10-15

## 2019-10-15 ENCOUNTER — HOSPITAL ENCOUNTER (OUTPATIENT)
Facility: HOSPITAL | Age: 49
Setting detail: HOSPITAL OUTPATIENT SURGERY
Discharge: HOME | End: 2019-10-15
Attending: ORTHOPAEDIC SURGERY | Admitting: ORTHOPAEDIC SURGERY

## 2019-10-15 VITALS
RESPIRATION RATE: 16 BRPM | TEMPERATURE: 98.2 F | OXYGEN SATURATION: 97 % | DIASTOLIC BLOOD PRESSURE: 95 MMHG | WEIGHT: 145 LBS | SYSTOLIC BLOOD PRESSURE: 134 MMHG | BODY MASS INDEX: 24.75 KG/M2 | HEART RATE: 58 BPM | HEIGHT: 64 IN

## 2019-10-15 DIAGNOSIS — S91.032A: ICD-10-CM

## 2019-10-15 LAB
B-HCG UR QL: NEGATIVE
INTERNAL NEGATIVE CONTROL: NEGATIVE
INTERNAL POSITIVE CONTROL: POSITIVE
Lab: NORMAL

## 2019-10-15 PROCEDURE — 87205 SMEAR GRAM STAIN: CPT | Performed by: ORTHOPAEDIC SURGERY

## 2019-10-15 PROCEDURE — 87070 CULTURE OTHR SPECIMN AEROBIC: CPT | Performed by: ORTHOPAEDIC SURGERY

## 2019-10-15 PROCEDURE — 25010000002 FENTANYL CITRATE (PF) 100 MCG/2ML SOLUTION: Performed by: ANESTHESIOLOGY

## 2019-10-15 PROCEDURE — 25010000002 DAPTOMYCIN PER 1 MG: Performed by: INTERNAL MEDICINE

## 2019-10-15 PROCEDURE — 87102 FUNGUS ISOLATION CULTURE: CPT | Performed by: ORTHOPAEDIC SURGERY

## 2019-10-15 PROCEDURE — 87116 MYCOBACTERIA CULTURE: CPT | Performed by: ORTHOPAEDIC SURGERY

## 2019-10-15 PROCEDURE — 25010000002 PROPOFOL 10 MG/ML EMULSION: Performed by: NURSE ANESTHETIST, CERTIFIED REGISTERED

## 2019-10-15 PROCEDURE — 25010000002 KETOROLAC TROMETHAMINE PER 15 MG: Performed by: NURSE ANESTHETIST, CERTIFIED REGISTERED

## 2019-10-15 PROCEDURE — 27604 DRAIN LOWER LEG BURSA: CPT | Performed by: ORTHOPAEDIC SURGERY

## 2019-10-15 PROCEDURE — 88304 TISSUE EXAM BY PATHOLOGIST: CPT | Performed by: ORTHOPAEDIC SURGERY

## 2019-10-15 PROCEDURE — 87176 TISSUE HOMOGENIZATION CULTR: CPT | Performed by: ORTHOPAEDIC SURGERY

## 2019-10-15 PROCEDURE — 87075 CULTR BACTERIA EXCEPT BLOOD: CPT | Performed by: ORTHOPAEDIC SURGERY

## 2019-10-15 PROCEDURE — 25010000002 PROMETHAZINE PER 50 MG: Performed by: NURSE ANESTHETIST, CERTIFIED REGISTERED

## 2019-10-15 PROCEDURE — 25010000002 CEFTRIAXONE PER 250 MG: Performed by: INTERNAL MEDICINE

## 2019-10-15 PROCEDURE — 25010000002 ONDANSETRON PER 1 MG: Performed by: NURSE ANESTHETIST, CERTIFIED REGISTERED

## 2019-10-15 PROCEDURE — 81025 URINE PREGNANCY TEST: CPT | Performed by: ORTHOPAEDIC SURGERY

## 2019-10-15 PROCEDURE — 25010000002 DEXAMETHASONE PER 1 MG: Performed by: NURSE ANESTHETIST, CERTIFIED REGISTERED

## 2019-10-15 PROCEDURE — 87206 SMEAR FLUORESCENT/ACID STAI: CPT | Performed by: ORTHOPAEDIC SURGERY

## 2019-10-15 RX ORDER — DEXAMETHASONE SODIUM PHOSPHATE 4 MG/ML
INJECTION, SOLUTION INTRA-ARTICULAR; INTRALESIONAL; INTRAMUSCULAR; INTRAVENOUS; SOFT TISSUE AS NEEDED
Status: DISCONTINUED | OUTPATIENT
Start: 2019-10-15 | End: 2019-10-15 | Stop reason: SURG

## 2019-10-15 RX ORDER — SODIUM CHLORIDE 0.9 % (FLUSH) 0.9 %
3 SYRINGE (ML) INJECTION EVERY 12 HOURS SCHEDULED
Status: CANCELLED | OUTPATIENT
Start: 2019-10-15

## 2019-10-15 RX ORDER — SODIUM CHLORIDE 0.9 % (FLUSH) 0.9 %
3-10 SYRINGE (ML) INJECTION AS NEEDED
Status: CANCELLED | OUTPATIENT
Start: 2019-10-15

## 2019-10-15 RX ORDER — FAMOTIDINE 20 MG/1
20 TABLET, FILM COATED ORAL ONCE
Status: COMPLETED | OUTPATIENT
Start: 2019-10-15 | End: 2019-10-15

## 2019-10-15 RX ORDER — MEPERIDINE HYDROCHLORIDE 25 MG/ML
12.5 INJECTION INTRAMUSCULAR; INTRAVENOUS; SUBCUTANEOUS
Status: DISCONTINUED | OUTPATIENT
Start: 2019-10-15 | End: 2019-10-15 | Stop reason: HOSPADM

## 2019-10-15 RX ORDER — LABETALOL HYDROCHLORIDE 5 MG/ML
5 INJECTION, SOLUTION INTRAVENOUS
Status: DISCONTINUED | OUTPATIENT
Start: 2019-10-15 | End: 2019-10-15 | Stop reason: HOSPADM

## 2019-10-15 RX ORDER — HYDROCODONE BITARTRATE AND ACETAMINOPHEN 5; 325 MG/1; MG/1
1 TABLET ORAL ONCE AS NEEDED
Status: DISCONTINUED | OUTPATIENT
Start: 2019-10-15 | End: 2019-10-15 | Stop reason: HOSPADM

## 2019-10-15 RX ORDER — PROMETHAZINE HYDROCHLORIDE 25 MG/ML
6.25 INJECTION, SOLUTION INTRAMUSCULAR; INTRAVENOUS ONCE AS NEEDED
Status: COMPLETED | OUTPATIENT
Start: 2019-10-15 | End: 2019-10-15

## 2019-10-15 RX ORDER — ONDANSETRON 2 MG/ML
4 INJECTION INTRAMUSCULAR; INTRAVENOUS ONCE AS NEEDED
Status: DISCONTINUED | OUTPATIENT
Start: 2019-10-15 | End: 2019-10-15 | Stop reason: HOSPADM

## 2019-10-15 RX ORDER — FENTANYL CITRATE 50 UG/ML
50 INJECTION, SOLUTION INTRAMUSCULAR; INTRAVENOUS
Status: DISCONTINUED | OUTPATIENT
Start: 2019-10-15 | End: 2019-10-15 | Stop reason: HOSPADM

## 2019-10-15 RX ORDER — HYDROCODONE BITARTRATE AND ACETAMINOPHEN 7.5; 325 MG/1; MG/1
1 TABLET ORAL ONCE AS NEEDED
Status: COMPLETED | OUTPATIENT
Start: 2019-10-15 | End: 2019-10-15

## 2019-10-15 RX ORDER — PROPOFOL 10 MG/ML
VIAL (ML) INTRAVENOUS AS NEEDED
Status: DISCONTINUED | OUTPATIENT
Start: 2019-10-15 | End: 2019-10-15 | Stop reason: SURG

## 2019-10-15 RX ORDER — ONDANSETRON 2 MG/ML
INJECTION INTRAMUSCULAR; INTRAVENOUS AS NEEDED
Status: DISCONTINUED | OUTPATIENT
Start: 2019-10-15 | End: 2019-10-15 | Stop reason: SURG

## 2019-10-15 RX ORDER — LIDOCAINE HYDROCHLORIDE 10 MG/ML
0.5 INJECTION, SOLUTION EPIDURAL; INFILTRATION; INTRACAUDAL; PERINEURAL ONCE AS NEEDED
Status: COMPLETED | OUTPATIENT
Start: 2019-10-15 | End: 2019-10-15

## 2019-10-15 RX ORDER — PROMETHAZINE HYDROCHLORIDE 25 MG/1
25 TABLET ORAL ONCE AS NEEDED
Status: COMPLETED | OUTPATIENT
Start: 2019-10-15 | End: 2019-10-15

## 2019-10-15 RX ORDER — NALOXONE HCL 0.4 MG/ML
0.4 VIAL (ML) INJECTION AS NEEDED
Status: DISCONTINUED | OUTPATIENT
Start: 2019-10-15 | End: 2019-10-15 | Stop reason: HOSPADM

## 2019-10-15 RX ORDER — SODIUM CHLORIDE 0.9 % (FLUSH) 0.9 %
3 SYRINGE (ML) INJECTION EVERY 12 HOURS SCHEDULED
Status: DISCONTINUED | OUTPATIENT
Start: 2019-10-15 | End: 2019-10-15 | Stop reason: HOSPADM

## 2019-10-15 RX ORDER — SODIUM CHLORIDE, SODIUM LACTATE, POTASSIUM CHLORIDE, CALCIUM CHLORIDE 600; 310; 30; 20 MG/100ML; MG/100ML; MG/100ML; MG/100ML
9 INJECTION, SOLUTION INTRAVENOUS CONTINUOUS
Status: DISCONTINUED | OUTPATIENT
Start: 2019-10-15 | End: 2019-10-15 | Stop reason: HOSPADM

## 2019-10-15 RX ORDER — HYDROMORPHONE HYDROCHLORIDE 1 MG/ML
0.5 INJECTION, SOLUTION INTRAMUSCULAR; INTRAVENOUS; SUBCUTANEOUS
Status: DISCONTINUED | OUTPATIENT
Start: 2019-10-15 | End: 2019-10-15 | Stop reason: HOSPADM

## 2019-10-15 RX ORDER — HYDRALAZINE HYDROCHLORIDE 20 MG/ML
5 INJECTION INTRAMUSCULAR; INTRAVENOUS
Status: DISCONTINUED | OUTPATIENT
Start: 2019-10-15 | End: 2019-10-15 | Stop reason: HOSPADM

## 2019-10-15 RX ORDER — FAMOTIDINE 10 MG/ML
20 INJECTION, SOLUTION INTRAVENOUS ONCE
Status: CANCELLED | OUTPATIENT
Start: 2019-10-15 | End: 2019-10-15

## 2019-10-15 RX ORDER — IPRATROPIUM BROMIDE AND ALBUTEROL SULFATE 2.5; .5 MG/3ML; MG/3ML
3 SOLUTION RESPIRATORY (INHALATION) ONCE AS NEEDED
Status: DISCONTINUED | OUTPATIENT
Start: 2019-10-15 | End: 2019-10-15 | Stop reason: HOSPADM

## 2019-10-15 RX ORDER — SODIUM CHLORIDE 0.9 % (FLUSH) 0.9 %
3-10 SYRINGE (ML) INJECTION AS NEEDED
Status: DISCONTINUED | OUTPATIENT
Start: 2019-10-15 | End: 2019-10-15 | Stop reason: HOSPADM

## 2019-10-15 RX ORDER — KETOROLAC TROMETHAMINE 30 MG/ML
INJECTION, SOLUTION INTRAMUSCULAR; INTRAVENOUS AS NEEDED
Status: DISCONTINUED | OUTPATIENT
Start: 2019-10-15 | End: 2019-10-15 | Stop reason: SURG

## 2019-10-15 RX ORDER — PROMETHAZINE HYDROCHLORIDE 25 MG/1
25 SUPPOSITORY RECTAL ONCE AS NEEDED
Status: COMPLETED | OUTPATIENT
Start: 2019-10-15 | End: 2019-10-15

## 2019-10-15 RX ORDER — LIDOCAINE HYDROCHLORIDE 10 MG/ML
INJECTION, SOLUTION EPIDURAL; INFILTRATION; INTRACAUDAL; PERINEURAL AS NEEDED
Status: DISCONTINUED | OUTPATIENT
Start: 2019-10-15 | End: 2019-10-15 | Stop reason: SURG

## 2019-10-15 RX ADMIN — DEXAMETHASONE SODIUM PHOSPHATE 4 MG: 4 INJECTION, SOLUTION INTRAMUSCULAR; INTRAVENOUS at 12:44

## 2019-10-15 RX ADMIN — CEFTRIAXONE 2 G: 1 INJECTION, POWDER, FOR SOLUTION INTRAMUSCULAR; INTRAVENOUS at 14:38

## 2019-10-15 RX ADMIN — KETOROLAC TROMETHAMINE 30 MG: 30 INJECTION, SOLUTION INTRAMUSCULAR at 12:44

## 2019-10-15 RX ADMIN — FENTANYL CITRATE 50 MCG: 50 INJECTION INTRAMUSCULAR; INTRAVENOUS at 13:17

## 2019-10-15 RX ADMIN — SODIUM CHLORIDE, POTASSIUM CHLORIDE, SODIUM LACTATE AND CALCIUM CHLORIDE 9 ML/HR: 600; 310; 30; 20 INJECTION, SOLUTION INTRAVENOUS at 11:01

## 2019-10-15 RX ADMIN — DAPTOMYCIN 500 MG: 500 INJECTION, POWDER, LYOPHILIZED, FOR SOLUTION INTRAVENOUS at 13:52

## 2019-10-15 RX ADMIN — LIDOCAINE HYDROCHLORIDE 50 MG: 10 INJECTION, SOLUTION EPIDURAL; INFILTRATION; INTRACAUDAL; PERINEURAL at 12:19

## 2019-10-15 RX ADMIN — PROPOFOL 200 MG: 10 INJECTION, EMULSION INTRAVENOUS at 12:19

## 2019-10-15 RX ADMIN — FAMOTIDINE 20 MG: 20 TABLET ORAL at 11:00

## 2019-10-15 RX ADMIN — HYDROCODONE BITARTRATE AND ACETAMINOPHEN 1 TABLET: 7.5; 325 TABLET ORAL at 14:02

## 2019-10-15 RX ADMIN — FENTANYL CITRATE 50 MCG: 50 INJECTION INTRAMUSCULAR; INTRAVENOUS at 15:10

## 2019-10-15 RX ADMIN — FENTANYL CITRATE 50 MCG: 50 INJECTION INTRAMUSCULAR; INTRAVENOUS at 13:52

## 2019-10-15 RX ADMIN — PROMETHAZINE HYDROCHLORIDE 6.25 MG: 25 INJECTION INTRAMUSCULAR; INTRAVENOUS at 15:27

## 2019-10-15 RX ADMIN — ONDANSETRON 4 MG: 2 INJECTION INTRAMUSCULAR; INTRAVENOUS at 12:44

## 2019-10-15 RX ADMIN — LIDOCAINE HYDROCHLORIDE 0.5 ML: 10 INJECTION, SOLUTION EPIDURAL; INFILTRATION; INTRACAUDAL; PERINEURAL at 11:00

## 2019-10-15 NOTE — ANESTHESIA PREPROCEDURE EVALUATION
Anesthesia Evaluation     Patient summary reviewed and Nursing notes reviewed                Airway   Mallampati: I  TM distance: >3 FB  Neck ROM: full  No difficulty expected  Dental - normal exam     Pulmonary - negative pulmonary ROS and normal exam   Cardiovascular - negative cardio ROS and normal exam        Neuro/Psych  (+) headaches,     GI/Hepatic/Renal/Endo    (+)  GERD,      Musculoskeletal (-) negative ROS    Abdominal  - normal exam    Bowel sounds: normal.   Substance History - negative use     OB/GYN negative ob/gyn ROS         Other                        Anesthesia Plan    ASA 2     general     intravenous induction   Anesthetic plan, all risks, benefits, and alternatives have been provided, discussed and informed consent has been obtained with: patient.    Plan discussed with CRNA.

## 2019-10-15 NOTE — OP NOTE
Operative Report    10/15/19  12:48 PM    Preoperative diagnosis: Infected puncture wound left medial ankle with retained foreign body, stingray edgar    Postoperative diagnosis: Same    Anesthesia: General with blocks for postop pain control    Surgeon: Jennifer Vidal M.D.    Assistant: Kathleen BRADFORD, present for the entire procedure including prepping, draping, retraction, closure, dressing.    Operative procedure: I&D left medial ankle bursa with removal of foreign body and abscess    Operative indications: This is an extremely pleasant 49-year-old woman who sustained a stingray injury to the left medial malleolar bursa approximately 3 weeks ago.  At about 10 days after the injury she developed redness and swelling and pain.  At the time of injury she had appropriately applied hot water to the area.  She has been on IV antibiotics but the area is still red and painful.  I suspected a retained foreign body.  We did an MRI which did not specifically identify a foreign body, however the area is so small the MRI resolution probably could not pick it up.  I recommended exploration, irrigation and debridement of the bursa.  At the time of surgery I found a cartilaginous fishhook shaped edgar in the bursa.  Cultures were obtained per routine.    Operative procedure: The patient was taken to the operating room where general anesthesia was induced without difficulty.  The antibiotics were held until after we obtained a culture.  The left leg was then prepped and draped in the usual sterile fashion with a well-padded tourniquet on the thigh.  The appropriate timeout was called.  The leg was elevated and the tourniquet inflated to 350 mmHg, tourniquet time was 6 minutes.  I explored the puncture wound.  There was a hard cartilaginous fishhook shaped edgar in the wound.  This was removed and sent with pathology.  I then excised the puncture wound, I removed all the granulation tissue from the medial malleolar bursa.  This  was done sharply with a scalpel, approximately 2 cm.  When there was no further abnormal tissue, no necrosis, no purulence remained, the tourniquet was released.  The wound was then irrigated copiously with antibiotic solution using pulsatile lavage.  It was closed loosely with nylon.  The ankle was dressed sterilely.  She was awakened, extubated and transferred to recovery in stable condition.  Postop plan will be discharged home continuation of antibiotics.  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    Estimated blood loss: 3 cc    Specimens: Cultures and permanent    Drains: None    Complications: None    Jennifer Vidal MD  10/15/19  12:48 PM

## 2019-10-15 NOTE — ANESTHESIA PROCEDURE NOTES
Airway  Urgency: elective    Date/Time: 10/15/2019 12:20 PM  Airway not difficult    General Information and Staff    Patient location during procedure: OR  CRNA: Lauro Castro CRNA    Indications and Patient Condition  Indications for airway management: airway protection    Preoxygenated: yes  Mask difficulty assessment: 1 - vent by mask    Final Airway Details  Final airway type: supraglottic airway      Successful airway: I-gel  Size 4    Number of attempts at approach: 1    Additional Comments  LMA placed without difficulty, ventilation with assist, equal breath sounds and symmetric chest rise and fall

## 2019-10-15 NOTE — DISCHARGE INSTRUCTIONS
1. Weight bear as tolerated on left foot, but limited activity  2. Elevate operated foot over heart  3. Change the dressing every 4 days, thin layer Bactroban cream (in chart) gauze and ace bandage, do not get the foot wet.    4. Call the office if any problems: [phone]  5. Take the Zofran with the pain meds if you have nausea/vomiting  6. Take 81mg aspirin to help prevent blood clots

## 2019-10-15 NOTE — BRIEF OP NOTE
Orthopedics I&D left ankle puncture wound  Brief Op Note    Ondina Loo  10/15/2019    Pre-op Diagnosis:   Infected puncture wound with retained foreign body left ankle    Post-op Diagnosis:  same       Post-Op Diagnosis Codes:     * Puncture wound of left ankle with complication, initial encounter [S91.032A]    Procedure(s):  I&D left ankle puncture wound    Surgeon(s):  Jennifer Vidal MD    Anesthesia:  General with Block    Staff:   Circulator: Alexei Monique RN; Matthew Scott RN  Scrub Person: Irish Mensah; Derrick Barker RN  Nursing Assistant: Rajni Martins    Estimated Blood Loss:3cc      Specimens: cultures, foreign body, abscess      Drains:  none    Complications:  None    Tourniquet:: 6min    Dressing:soft    Disposition:rr stable    Jennifer Vidal MD     Date: 10/15/2019  Time: 12:47 PM

## 2019-10-15 NOTE — ANESTHESIA POSTPROCEDURE EVALUATION
Patient: Ondina Loo    Procedure Summary     Date:  10/15/19 [facility] / [facility]:   [facility]    Anesthesia Start:  1217 Anesthesia Stop:  1308    Procedure:  I&D left ankle puncture wound (Left ) Diagnosis:       Puncture wound of left ankle with complication, initial encounter      (Puncture wound of left ankle with complication, initial encounter [S91.032A])    Surgeon:  Jennifer Vidal MD Provider:  Abdifatah Medina MD    Anesthesia Type:  general ASA Status:  2          Anesthesia Type: general  Last vitals  BP   125/81 (10/15/19 1304)   Temp   98.2 °F (36.8 °C) (10/15/19 1304)   Pulse   74 (10/15/19 1304)   Resp   16 (10/15/19 1304)     SpO2   95 % (10/15/19 1304)     Post Anesthesia Care and Evaluation    Patient location during evaluation: PACU  Patient participation: complete - patient participated  Level of consciousness: awake and alert  Pain management: adequate  Airway patency: patent  Anesthetic complications: No anesthetic complications  PONV Status: none  Cardiovascular status: hemodynamically stable and acceptable  Respiratory status: nonlabored ventilation, acceptable and nasal cannula  Hydration status: acceptable

## 2019-10-15 NOTE — INTERVAL H&P NOTE
"Pre-Op H&P (See Recent Office Note Attached for Full H&P)    Chief complaint: Left ankle wound    Review of Systems:  General ROS:  no fever, chills, rashes, No change since last office visit  Cardiovascular ROS: no chest pain or dyspnea on exertion  Respiratory ROS: no cough, shortness of breath, or wheezing    Meds:    No current facility-administered medications on file prior to encounter.      Current Outpatient Medications on File Prior to Encounter   Medication Sig Dispense Refill   • doxycycline (ADOXA) 100 MG tablet Take 100 mg by mouth 2 (Two) Times a Day.  0   • rizatriptan (MAXALT) 10 MG tablet Take 10 mg by mouth 1 (One) Time As Needed.  1   • SUMAtriptan (IMITREX) 100 MG tablet Take 100 mg by mouth 1 (One) Time As Needed for Migraine.     • tiZANidine (ZANAFLEX) 2 MG tablet Take 2 mg by mouth Every Night.  0   • topiramate (TOPAMAX) 100 MG tablet Take 200 mg by mouth Every Night.     • naratriptan (AMERGE) 1 MG tablet Take 1 mg by mouth 1 (One) Time As Needed for Migraine.     • promethazine (PHENERGAN) 12.5 MG tablet Take 1 tablet by mouth Every 6 (Six) Hours As Needed for Nausea or Vomiting. 15 tablet 0       Vital Signs:  /100 (BP Location: Right arm, Patient Position: Lying)   Pulse 70   Temp 98.4 °F (36.9 °C) (Tympanic)   Resp 18   Ht 162.6 cm (64\")   Wt 65.8 kg (145 lb)   LMP 09/19/2019   SpO2 98%   BMI 24.89 kg/m²     Physical Exam:    CV:  S1S2 regular rate and rhythm, no murmur               Resp:  Clear to auscultation; respirations regular, even and unlabored    Results Review:     Lab Results   Component Value Date    WBC 6.63 10/11/2019    HGB 14.1 10/11/2019    HCT 43.8 10/11/2019    MCV 96.7 10/11/2019     10/11/2019    NEUTROABS 5.40 05/03/2018    GLUCOSE 103 (H) 10/11/2019    BUN 11 10/11/2019    CREATININE 0.76 10/11/2019    EGFRIFNONA 82 10/11/2019     10/11/2019    K 4.0 10/11/2019     10/11/2019    CO2 23.0 10/11/2019    CALCIUM 9.6 10/11/2019    " ALBUMIN 4.2 06/08/2015    AST 19 06/08/2015    ALT 14 06/08/2015    BILITOT 0.4 06/08/2015        I reviewed the patient's new clinical results.    Cancer Staging (if applicable)  Cancer Patient: __ yes _x_no __unknown; If yes, clinical stage T:__ N:__M:__, stage group or __N/A    Assessment/Plan:    1. Puncture wound of left ankle with complication, initial encounter  Even though the MRI does not show a definite foreign body, the erythema and swelling have not gone away with IV antibiotics and oral antibiotics.  Based on past experience with these type of injuries, there is often a tiny piece of cartilaginous stingray edgar in the wound, and it causes a persistent problem.  I would recommend we I&D this.  By removing the granulation tissue we can often resolve the problem.  The goal would be to prevent a chronic smoldering wound.  I would recommend I&D.  She is not septic, she does not have a fever I do not think we need to do it over the weekend.  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  Dr. Chance Herrera is following her antibiotics.  I texted him about the plan.  We discussed the risks including but not limited to: death, infection, neurovascular damage, strokes, heart attacks, blood clots, chronic pain, deformity, stiffness, need for  further surgery,  amputation, etc.  Questions asked and answered in detail.      Cami Maher, APRN  10/15/2019   12:05 PM

## 2019-10-16 LAB
CYTO UR: NORMAL
LAB AP CASE REPORT: NORMAL
LAB AP CLINICAL INFORMATION: NORMAL
LAB AP DIAGNOSIS COMMENT: NORMAL
PATH REPORT.FINAL DX SPEC: NORMAL
PATH REPORT.GROSS SPEC: NORMAL

## 2019-10-18 ENCOUNTER — OFFICE VISIT (OUTPATIENT)
Dept: ORTHOPEDIC SURGERY | Facility: CLINIC | Age: 49
End: 2019-10-18

## 2019-10-18 DIAGNOSIS — S91.032A: Primary | ICD-10-CM

## 2019-10-18 LAB
BACTERIA SPEC AEROBE CULT: NORMAL
GRAM STN SPEC: NORMAL

## 2019-10-18 PROCEDURE — 99024 POSTOP FOLLOW-UP VISIT: CPT | Performed by: ORTHOPAEDIC SURGERY

## 2019-10-18 NOTE — PROGRESS NOTES
Post-op (3 day S/P I&D Left ankle puncture wound )      Ondina Loo is 3 days status post removal stingray edgar and I&D abscess left medial ankle, 10/15/2019. She reports no fever, chills.  She reports pain is well controlled.  They have been taking aspirin for DVT prophylaxis.  They have been weight bearing on heel in post op shoe.      Past Surgical History:   Procedure Laterality Date   • BREAST AUGMENTATION     • BREAST CAPSULOTOMY, IMPLANT REVISION Bilateral 5/8/2018    Procedure: BILATERAL CAPSULECTOMY AND BREAST IMPLANT REMOVAL;  Surgeon: Eileen Lares MD;  Location: [facility] OR;  Service: Plastics   • COLONOSCOPY     • INCISION AND DRAINAGE LEG Left 10/15/2019    Procedure: I&D LEFT ANKLE PUNCTURE WOUND;  Surgeon: Jennifer Vidal MD;  Location: [facility] OR;  Service: Orthopedics   • OOPHORECTOMY Left        LMP 09/19/2019         Good alignment, no erythema, no drainage, no sign of infection, normal post op swelling left ankle incision    phone conversation with physician I discussed the case with Dr. Herrera    Assessment and Plan:   1. Puncture wound of left ankle with complication, initial encounter  Doing well status post removal of stingray edgar and I&D abscess left ankle.  Continue IV antibiotics and limited activities.  She may change the dressing every 2 to 3 days.  I will see her again in a week to evaluate the incision, off work until then

## 2019-10-20 LAB — BACTERIA SPEC ANAEROBE CULT: NORMAL

## 2019-10-28 ENCOUNTER — OFFICE VISIT (OUTPATIENT)
Dept: ORTHOPEDIC SURGERY | Facility: CLINIC | Age: 49
End: 2019-10-28

## 2019-10-28 DIAGNOSIS — S91.032A: Primary | ICD-10-CM

## 2019-10-28 PROCEDURE — 99024 POSTOP FOLLOW-UP VISIT: CPT | Performed by: ORTHOPAEDIC SURGERY

## 2019-10-28 RX ORDER — CEPHALEXIN 500 MG/1
CAPSULE ORAL
Refills: 0 | COMMUNITY
Start: 2019-10-21 | End: 2019-11-04 | Stop reason: ALTCHOICE

## 2019-10-28 RX ORDER — SULFAMETHOXAZOLE AND TRIMETHOPRIM 800; 160 MG/1; MG/1
1 TABLET ORAL 2 TIMES DAILY
Refills: 0 | COMMUNITY
Start: 2019-10-21 | End: 2019-11-11

## 2019-10-28 NOTE — PROGRESS NOTES
Post-op (1 week follow up -2 weeks S/P I&D Left ankle puncture wound 10/15/19)      Ondina Loo is 2 weeks status post I&D left ankle wound with removal of foreign body from stingray injury, 10/15/2019. She reports no fever, chills.  She reports pain is still having some nerve pain.  They have been taking aspirin for DVT prophylaxis.  They have been weight bearing as tolerated.      Past Surgical History:   Procedure Laterality Date   • BREAST AUGMENTATION     • BREAST CAPSULOTOMY, IMPLANT REVISION Bilateral 5/8/2018    Procedure: BILATERAL CAPSULECTOMY AND BREAST IMPLANT REMOVAL;  Surgeon: Eileen Lares MD;  Location: [facility] OR;  Service: Plastics   • COLONOSCOPY     • INCISION AND DRAINAGE LEG Left 10/15/2019    Procedure: I&D LEFT ANKLE PUNCTURE WOUND;  Surgeon: Jennifer Vidal MD;  Location: [facility] OR;  Service: Orthopedics   • OOPHORECTOMY Left        There were no vitals taken for this visit.        Good alignment, no erythema, no drainage, no sign of infection, normal post op swelling left ankle, mildly dysesthetic in the saphenous nerve    reviewed prior lab results all cultures have currently been negative, path report was consistent with the stingray edgar and local infection    Assessment and Plan:   1. Puncture wound of left ankle with complication, initial encounter  Improving steadily.  I remove the sutures and applied Steri-Strips.  She may take a shower but not soak it until all the scabs are gone.  I think that the dysesthetic pain will improve as local swelling does.  I will see her again in a week for repeat exam continue off work

## 2019-11-01 ENCOUNTER — LAB (OUTPATIENT)
Dept: LAB | Facility: HOSPITAL | Age: 49
End: 2019-11-01

## 2019-11-01 ENCOUNTER — TRANSCRIBE ORDERS (OUTPATIENT)
Dept: LAB | Facility: HOSPITAL | Age: 49
End: 2019-11-01
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
## 2019-11-01 DIAGNOSIS — S91.042A: ICD-10-CM

## 2019-11-01 DIAGNOSIS — L03.116 CELLULITIS OF LEFT FOOT: ICD-10-CM

## 2019-11-01 DIAGNOSIS — L02.416 ABSCESS OF LEFT HIP: ICD-10-CM

## 2019-11-01 DIAGNOSIS — L02.416 ABSCESS OF LEFT HIP: Primary | ICD-10-CM

## 2019-11-01 LAB
BASOPHILS # BLD AUTO: 0.11 10*3/MM3 (ref 0–0.2)
BASOPHILS NFR BLD AUTO: 1.6 % (ref 0–1.5)
DEPRECATED RDW RBC AUTO: 46 FL (ref 37–54)
EOSINOPHIL # BLD AUTO: 0.5 10*3/MM3 (ref 0–0.4)
EOSINOPHIL NFR BLD AUTO: 7.2 % (ref 0.3–6.2)
ERYTHROCYTE [DISTWIDTH] IN BLOOD BY AUTOMATED COUNT: 12.9 % (ref 12.3–15.4)
ERYTHROCYTE [SEDIMENTATION RATE] IN BLOOD: 17 MM/HR (ref 0–20)
HCT VFR BLD AUTO: 42.7 % (ref 34–46.6)
HGB BLD-MCNC: 13.8 G/DL (ref 12–15.9)
IMM GRANULOCYTES # BLD AUTO: 0.02 10*3/MM3 (ref 0–0.05)
IMM GRANULOCYTES NFR BLD AUTO: 0.3 % (ref 0–0.5)
LYMPHOCYTES # BLD AUTO: 2.7 10*3/MM3 (ref 0.7–3.1)
LYMPHOCYTES NFR BLD AUTO: 38.8 % (ref 19.6–45.3)
MCH RBC QN AUTO: 30.9 PG (ref 26.6–33)
MCHC RBC AUTO-ENTMCNC: 32.3 G/DL (ref 31.5–35.7)
MCV RBC AUTO: 95.5 FL (ref 79–97)
MONOCYTES # BLD AUTO: 0.47 10*3/MM3 (ref 0.1–0.9)
MONOCYTES NFR BLD AUTO: 6.8 % (ref 5–12)
NEUTROPHILS # BLD AUTO: 3.15 10*3/MM3 (ref 1.7–7)
NEUTROPHILS NFR BLD AUTO: 45.3 % (ref 42.7–76)
NRBC BLD AUTO-RTO: 0 /100 WBC (ref 0–0.2)
PLATELET # BLD AUTO: 365 10*3/MM3 (ref 140–450)
PMV BLD AUTO: 10.6 FL (ref 6–12)
RBC # BLD AUTO: 4.47 10*6/MM3 (ref 3.77–5.28)
WBC NRBC COR # BLD: 6.95 10*3/MM3 (ref 3.4–10.8)

## 2019-11-01 PROCEDURE — 85652 RBC SED RATE AUTOMATED: CPT

## 2019-11-01 PROCEDURE — 36415 COLL VENOUS BLD VENIPUNCTURE: CPT

## 2019-11-01 PROCEDURE — 85025 COMPLETE CBC W/AUTO DIFF WBC: CPT

## 2019-11-04 ENCOUNTER — LAB (OUTPATIENT)
Dept: LAB | Facility: HOSPITAL | Age: 49
End: 2019-11-04

## 2019-11-04 ENCOUNTER — OFFICE VISIT (OUTPATIENT)
Dept: ORTHOPEDIC SURGERY | Facility: CLINIC | Age: 49
End: 2019-11-04

## 2019-11-04 ENCOUNTER — TRANSCRIBE ORDERS (OUTPATIENT)
Dept: LAB | Facility: HOSPITAL | Age: 49
End: 2019-11-04

## 2019-11-04 ENCOUNTER — TELEPHONE (OUTPATIENT)
Dept: ORTHOPEDIC SURGERY | Facility: CLINIC | Age: 49
End: 2019-11-04
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
## 2019-11-04 DIAGNOSIS — S91.042A: ICD-10-CM

## 2019-11-04 DIAGNOSIS — S91.032A: Primary | ICD-10-CM

## 2019-11-04 DIAGNOSIS — L02.416 ABSCESS OF LEFT HIP: ICD-10-CM

## 2019-11-04 DIAGNOSIS — L02.416 ABSCESS OF LEFT HIP: Primary | ICD-10-CM

## 2019-11-04 DIAGNOSIS — L03.116 CELLULITIS OF LEFT FOOT: ICD-10-CM

## 2019-11-04 LAB
ALBUMIN SERPL-MCNC: 4.3 G/DL (ref 3.5–5.2)
ALBUMIN/GLOB SERPL: 1.3 G/DL
ALP SERPL-CCNC: 74 U/L (ref 39–117)
ALT SERPL W P-5'-P-CCNC: 11 U/L (ref 1–33)
ANION GAP SERPL CALCULATED.3IONS-SCNC: 14 MMOL/L (ref 5–15)
AST SERPL-CCNC: 17 U/L (ref 1–32)
BASOPHILS # BLD AUTO: 0.08 10*3/MM3 (ref 0–0.2)
BASOPHILS NFR BLD AUTO: 0.9 % (ref 0–1.5)
BILIRUB SERPL-MCNC: 0.3 MG/DL (ref 0.2–1.2)
BUN BLD-MCNC: 16 MG/DL (ref 6–20)
BUN/CREAT SERPL: 21.9 (ref 7–25)
CALCIUM SPEC-SCNC: 9.3 MG/DL (ref 8.6–10.5)
CHLORIDE SERPL-SCNC: 103 MMOL/L (ref 98–107)
CO2 SERPL-SCNC: 24 MMOL/L (ref 22–29)
CREAT BLD-MCNC: 0.73 MG/DL (ref 0.57–1)
CRP SERPL-MCNC: 0.07 MG/DL (ref 0–0.5)
DEPRECATED RDW RBC AUTO: 46.9 FL (ref 37–54)
EOSINOPHIL # BLD AUTO: 0.27 10*3/MM3 (ref 0–0.4)
EOSINOPHIL NFR BLD AUTO: 3.2 % (ref 0.3–6.2)
ERYTHROCYTE [DISTWIDTH] IN BLOOD BY AUTOMATED COUNT: 13.2 % (ref 12.3–15.4)
ERYTHROCYTE [SEDIMENTATION RATE] IN BLOOD: 11 MM/HR (ref 0–20)
GFR SERPL CREATININE-BSD FRML MDRD: 85 ML/MIN/1.73
GLOBULIN UR ELPH-MCNC: 3.3 GM/DL
GLUCOSE BLD-MCNC: 78 MG/DL (ref 65–99)
HCT VFR BLD AUTO: 43 % (ref 34–46.6)
HGB BLD-MCNC: 13.6 G/DL (ref 12–15.9)
IMM GRANULOCYTES # BLD AUTO: 0.05 10*3/MM3 (ref 0–0.05)
IMM GRANULOCYTES NFR BLD AUTO: 0.6 % (ref 0–0.5)
LYMPHOCYTES # BLD AUTO: 2.39 10*3/MM3 (ref 0.7–3.1)
LYMPHOCYTES NFR BLD AUTO: 28 % (ref 19.6–45.3)
MCH RBC QN AUTO: 30.6 PG (ref 26.6–33)
MCHC RBC AUTO-ENTMCNC: 31.6 G/DL (ref 31.5–35.7)
MCV RBC AUTO: 96.6 FL (ref 79–97)
MONOCYTES # BLD AUTO: 0.56 10*3/MM3 (ref 0.1–0.9)
MONOCYTES NFR BLD AUTO: 6.6 % (ref 5–12)
NEUTROPHILS # BLD AUTO: 5.19 10*3/MM3 (ref 1.7–7)
NEUTROPHILS NFR BLD AUTO: 60.7 % (ref 42.7–76)
NRBC BLD AUTO-RTO: 0 /100 WBC (ref 0–0.2)
PLATELET # BLD AUTO: 374 10*3/MM3 (ref 140–450)
PMV BLD AUTO: 10.1 FL (ref 6–12)
POTASSIUM BLD-SCNC: 3.7 MMOL/L (ref 3.5–5.2)
PROT SERPL-MCNC: 7.6 G/DL (ref 6–8.5)
RBC # BLD AUTO: 4.45 10*6/MM3 (ref 3.77–5.28)
SODIUM BLD-SCNC: 141 MMOL/L (ref 136–145)
WBC NRBC COR # BLD: 8.54 10*3/MM3 (ref 3.4–10.8)

## 2019-11-04 PROCEDURE — 36415 COLL VENOUS BLD VENIPUNCTURE: CPT

## 2019-11-04 PROCEDURE — 86140 C-REACTIVE PROTEIN: CPT

## 2019-11-04 PROCEDURE — 80053 COMPREHEN METABOLIC PANEL: CPT

## 2019-11-04 PROCEDURE — 99024 POSTOP FOLLOW-UP VISIT: CPT | Performed by: ORTHOPAEDIC SURGERY

## 2019-11-04 PROCEDURE — 85025 COMPLETE CBC W/AUTO DIFF WBC: CPT

## 2019-11-04 PROCEDURE — 85652 RBC SED RATE AUTOMATED: CPT

## 2019-11-04 RX ORDER — MOXIFLOXACIN HYDROCHLORIDE 400 MG/1
400 TABLET ORAL DAILY
Refills: 0 | COMMUNITY
Start: 2019-11-01 | End: 2019-12-02

## 2019-11-04 RX ORDER — DOXYCYCLINE 100 MG/1
100 CAPSULE ORAL 2 TIMES DAILY
Refills: 0 | COMMUNITY
Start: 2019-11-01 | End: 2019-12-02

## 2019-11-04 NOTE — PROGRESS NOTES
Post-op (3 weeks S/P I&D Left ankle puncture wound 10/15/19)      Ondina Loo is 3 weeks status post I&D left ankle puncture wound with removal of stingray edgar, 10/15/2019. She reports no fever, chills.  She reports pain is well controlled.  They have been taking aspirin for DVT prophylaxis.  They have been weight bearing as tolerated.      Past Surgical History:   Procedure Laterality Date   • BREAST AUGMENTATION     • BREAST CAPSULOTOMY, IMPLANT REVISION Bilateral 5/8/2018    Procedure: BILATERAL CAPSULECTOMY AND BREAST IMPLANT REMOVAL;  Surgeon: Eileen Lares MD;  Location: [facility];  Service: Plastics   • COLONOSCOPY     • INCISION AND DRAINAGE LEG Left 10/15/2019    Procedure: I&D LEFT ANKLE PUNCTURE WOUND;  Surgeon: Jennifer Vidal MD;  Location: [facility];  Service: Orthopedics   • OOPHORECTOMY Left        There were no vitals taken for this visit.       Left ankle wound looks worse this week, there is some central eschar but only local erythema, no purulent drainage, no significant swelling    none    Assessment and Plan:   1. Puncture wound of left ankle with complication, initial encounter  The wound looks a little bit worse this week.  Dr. Herrera has given her more antibiotics.  I want to keep a close eye on this I will see her again in a week with 3 views of the ankle.  She is not to return to work.  She is to keep a Bactroban dressing on it and elevate it.

## 2019-11-11 ENCOUNTER — APPOINTMENT (OUTPATIENT)
Dept: PREADMISSION TESTING | Facility: HOSPITAL | Age: 49
End: 2019-11-11

## 2019-11-11 ENCOUNTER — OFFICE VISIT (OUTPATIENT)
Dept: ORTHOPEDIC SURGERY | Facility: CLINIC | Age: 49
End: 2019-11-11

## 2019-11-11 VITALS — WEIGHT: 150.35 LBS | HEIGHT: 65 IN | BODY MASS INDEX: 25.05 KG/M2

## 2019-11-11 DIAGNOSIS — S91.032A PUNCTURE WOUND OF LEFT ANKLE, INITIAL ENCOUNTER: ICD-10-CM

## 2019-11-11 DIAGNOSIS — Z47.89 AFTERCARE FOLLOWING SURGERY OF THE MUSCULOSKELETAL SYSTEM: Primary | ICD-10-CM

## 2019-11-11 PROCEDURE — 99024 POSTOP FOLLOW-UP VISIT: CPT | Performed by: ORTHOPAEDIC SURGERY

## 2019-11-11 NOTE — H&P (VIEW-ONLY)
Post-op Follow-up (1 week f/u; 4 weeks S/P I&D Left ankle puncture wound 10/15/19)      Ondina Loo is 4 weeks status post I&D left ankle puncture wound, 10/15/2019. She reports no fever, chills.  She reports pain is worsening.  They have been taking aspirin for DVT prophylaxis.  They have been weight bearing as tolerated.  She notes increased swelling around the area,    Past Surgical History:   Procedure Laterality Date   • BREAST AUGMENTATION     • BREAST CAPSULOTOMY, IMPLANT REVISION Bilateral 5/8/2018    Procedure: BILATERAL CAPSULECTOMY AND BREAST IMPLANT REMOVAL;  Surgeon: Eileen Lares MD;  Location: [facility] OR;  Service: Plastics   • COLONOSCOPY     • INCISION AND DRAINAGE LEG Left 10/15/2019    Procedure: I&D LEFT ANKLE PUNCTURE WOUND;  Surgeon: Jennifer Vidal MD;  Location: [facility] OR;  Service: Orthopedics   • OOPHORECTOMY Left        There were no vitals taken for this visit.       More swelling around medial wound, 1cm erythema aournd it and some central eschar that almost looks necrotic, no definite purulence, she had some serosanguineous drainage    ordered and reviewed x-rays today and phone conversation with physician    Assessment and Plan:   1. Aftercare following surgery of the musculoskeletal system  I discussed this by phone with Dr. Chance Herrera.  I think this needs repeat debridement.  Whether it is bacteria that are not being covered or whether there is retained foreign body, I do not like the way the wound looks.  She is at risk to develop osteomyelitis.  After surgery we will keep her overnight, I will put her in a splint and she will be nonweightbearing.  I think we need to hold this still.  She understands.  We will plan to do this Thursday.We discussed the risks including but not limited to: death, infection, neurovascular damage, strokes, heart attacks, blood clots, chronic pain, deformity, stiffness, need for  further surgery,  amputation, etc.  Questions asked and  answered in detail.        - XR Ankle 3+ View Left

## 2019-11-11 NOTE — PROGRESS NOTES
Post-op Follow-up (1 week f/u; 4 weeks S/P I&D Left ankle puncture wound 10/15/19)      Ondina Loo is 4 weeks status post I&D left ankle puncture wound, 10/15/2019. She reports no fever, chills.  She reports pain is worsening.  They have been taking aspirin for DVT prophylaxis.  They have been weight bearing as tolerated.  She notes increased swelling around the area,    Past Surgical History:   Procedure Laterality Date   • BREAST AUGMENTATION     • BREAST CAPSULOTOMY, IMPLANT REVISION Bilateral 5/8/2018    Procedure: BILATERAL CAPSULECTOMY AND BREAST IMPLANT REMOVAL;  Surgeon: Eileen Lares MD;  Location: [facility] OR;  Service: Plastics   • COLONOSCOPY     • INCISION AND DRAINAGE LEG Left 10/15/2019    Procedure: I&D LEFT ANKLE PUNCTURE WOUND;  Surgeon: Jennifer Vidal MD;  Location: [facility] OR;  Service: Orthopedics   • OOPHORECTOMY Left        There were no vitals taken for this visit.       More swelling around medial wound, 1cm erythema aournd it and some central eschar that almost looks necrotic, no definite purulence, she had some serosanguineous drainage    ordered and reviewed x-rays today and phone conversation with physician    Assessment and Plan:   1. Aftercare following surgery of the musculoskeletal system  I discussed this by phone with Dr. Chance Herrera.  I think this needs repeat debridement.  Whether it is bacteria that are not being covered or whether there is retained foreign body, I do not like the way the wound looks.  She is at risk to develop osteomyelitis.  After surgery we will keep her overnight, I will put her in a splint and she will be nonweightbearing.  I think we need to hold this still.  She understands.  We will plan to do this Thursday.We discussed the risks including but not limited to: death, infection, neurovascular damage, strokes, heart attacks, blood clots, chronic pain, deformity, stiffness, need for  further surgery,  amputation, etc.  Questions asked and  answered in detail.        - XR Ankle 3+ View Left

## 2019-11-13 RX ORDER — HYDROCODONE BITARTRATE AND ACETAMINOPHEN 7.5; 325 MG/1; MG/1
1-2 TABLET ORAL EVERY 6 HOURS PRN
Qty: 45 TABLET | Refills: 0 | Status: SHIPPED | OUTPATIENT
Start: 2019-11-13 | End: 2019-11-20

## 2019-11-13 RX ORDER — PROMETHAZINE HYDROCHLORIDE 12.5 MG/1
12.5 TABLET ORAL EVERY 6 HOURS PRN
Qty: 30 TABLET | Refills: 0 | Status: SHIPPED | OUTPATIENT
Start: 2019-11-13 | End: 2019-11-15 | Stop reason: HOSPADM

## 2019-11-13 RX ORDER — ONDANSETRON 4 MG/1
4 TABLET, FILM COATED ORAL EVERY 6 HOURS PRN
Qty: 1 TABLET | Refills: 0 | OUTPATIENT
Start: 2019-11-13

## 2019-11-13 RX ORDER — OXYCODONE HYDROCHLORIDE AND ACETAMINOPHEN 5; 325 MG/1; MG/1
1-2 TABLET ORAL EVERY 6 HOURS PRN
Qty: 45 TABLET | Refills: 0 | Status: SHIPPED | OUTPATIENT
Start: 2019-11-13 | End: 2019-12-02

## 2019-11-14 ENCOUNTER — ANESTHESIA EVENT (OUTPATIENT)
Dept: PERIOP | Facility: HOSPITAL | Age: 49
End: 2019-11-14

## 2019-11-14 ENCOUNTER — ANESTHESIA (OUTPATIENT)
Dept: PERIOP | Facility: HOSPITAL | Age: 49
End: 2019-11-14

## 2019-11-14 ENCOUNTER — HOSPITAL ENCOUNTER (OUTPATIENT)
Facility: HOSPITAL | Age: 49
Discharge: HOME | End: 2019-11-15
Attending: ORTHOPAEDIC SURGERY | Admitting: ANESTHESIOLOGY

## 2019-11-14 DIAGNOSIS — S91.032A PUNCTURE WOUND OF LEFT ANKLE, INITIAL ENCOUNTER: ICD-10-CM

## 2019-11-14 PROBLEM — Z47.89 AFTERCARE FOLLOWING SURGERY OF THE MUSCULOSKELETAL SYSTEM: Status: ACTIVE | Noted: 2019-11-14

## 2019-11-14 PROBLEM — Z98.890 STATUS POST INCISION AND DRAINAGE: Status: ACTIVE | Noted: 2019-11-14

## 2019-11-14 LAB
B-HCG UR QL: NEGATIVE
CK SERPL-CCNC: 42 U/L (ref 20–180)
INTERNAL NEGATIVE CONTROL: NEGATIVE
INTERNAL POSITIVE CONTROL: POSITIVE
Lab: NORMAL

## 2019-11-14 PROCEDURE — 25010000003 LIDOCAINE 1 % SOLUTION: Performed by: NURSE ANESTHETIST, CERTIFIED REGISTERED

## 2019-11-14 PROCEDURE — 25010000002 FENTANYL CITRATE (PF) 100 MCG/2ML SOLUTION: Performed by: NURSE ANESTHETIST, CERTIFIED REGISTERED

## 2019-11-14 PROCEDURE — G0378 HOSPITAL OBSERVATION PER HR: HCPCS

## 2019-11-14 PROCEDURE — 87102 FUNGUS ISOLATION CULTURE: CPT | Performed by: ORTHOPAEDIC SURGERY

## 2019-11-14 PROCEDURE — 87206 SMEAR FLUORESCENT/ACID STAI: CPT | Performed by: ORTHOPAEDIC SURGERY

## 2019-11-14 PROCEDURE — 25010000002 HYDROMORPHONE PER 4 MG: Performed by: ANESTHESIOLOGY

## 2019-11-14 PROCEDURE — 87070 CULTURE OTHR SPECIMN AEROBIC: CPT | Performed by: ORTHOPAEDIC SURGERY

## 2019-11-14 PROCEDURE — 87075 CULTR BACTERIA EXCEPT BLOOD: CPT | Performed by: ORTHOPAEDIC SURGERY

## 2019-11-14 PROCEDURE — 87176 TISSUE HOMOGENIZATION CULTR: CPT | Performed by: ORTHOPAEDIC SURGERY

## 2019-11-14 PROCEDURE — 25010000002 PROPOFOL 10 MG/ML EMULSION: Performed by: NURSE ANESTHETIST, CERTIFIED REGISTERED

## 2019-11-14 PROCEDURE — 25010000002 ERTAPENEM PER 500 MG: Performed by: INTERNAL MEDICINE

## 2019-11-14 PROCEDURE — 63710000001 PROMETHAZINE PER 12.5 MG: Performed by: NURSE PRACTITIONER

## 2019-11-14 PROCEDURE — 81025 URINE PREGNANCY TEST: CPT | Performed by: ANESTHESIOLOGY

## 2019-11-14 PROCEDURE — 25010000002 PROMETHAZINE PER 50 MG: Performed by: NURSE ANESTHETIST, CERTIFIED REGISTERED

## 2019-11-14 PROCEDURE — 25010000002 DEXAMETHASONE PER 1 MG: Performed by: NURSE ANESTHETIST, CERTIFIED REGISTERED

## 2019-11-14 PROCEDURE — 87116 MYCOBACTERIA CULTURE: CPT | Performed by: ORTHOPAEDIC SURGERY

## 2019-11-14 PROCEDURE — 88304 TISSUE EXAM BY PATHOLOGIST: CPT | Performed by: ORTHOPAEDIC SURGERY

## 2019-11-14 PROCEDURE — 82550 ASSAY OF CK (CPK): CPT | Performed by: INTERNAL MEDICINE

## 2019-11-14 PROCEDURE — 87205 SMEAR GRAM STAIN: CPT | Performed by: ORTHOPAEDIC SURGERY

## 2019-11-14 PROCEDURE — 25010000002 KETOROLAC TROMETHAMINE PER 15 MG: Performed by: ANESTHESIOLOGY

## 2019-11-14 PROCEDURE — 25010000002 SUCCINYLCHOLINE PER 20 MG: Performed by: NURSE ANESTHETIST, CERTIFIED REGISTERED

## 2019-11-14 PROCEDURE — 27603 DRAIN LOWER LEG LESION: CPT | Performed by: ORTHOPAEDIC SURGERY

## 2019-11-14 RX ORDER — PROMETHAZINE HYDROCHLORIDE 25 MG/1
25 SUPPOSITORY RECTAL ONCE AS NEEDED
Status: DISCONTINUED | OUTPATIENT
Start: 2019-11-14 | End: 2019-11-14 | Stop reason: HOSPADM

## 2019-11-14 RX ORDER — FAMOTIDINE 20 MG/1
20 TABLET, FILM COATED ORAL ONCE
Status: COMPLETED | OUTPATIENT
Start: 2019-11-14 | End: 2019-11-14

## 2019-11-14 RX ORDER — DEXTROSE, SODIUM CHLORIDE, AND POTASSIUM CHLORIDE 5; .45; .15 G/100ML; G/100ML; G/100ML
50 INJECTION INTRAVENOUS CONTINUOUS
Status: DISCONTINUED | OUTPATIENT
Start: 2019-11-14 | End: 2019-11-15 | Stop reason: HOSPADM

## 2019-11-14 RX ORDER — EPHEDRINE SULFATE 50 MG/ML
5 INJECTION, SOLUTION INTRAVENOUS ONCE AS NEEDED
Status: DISCONTINUED | OUTPATIENT
Start: 2019-11-14 | End: 2019-11-14 | Stop reason: HOSPADM

## 2019-11-14 RX ORDER — TOPIRAMATE 100 MG/1
200 TABLET, FILM COATED ORAL NIGHTLY
Status: DISCONTINUED | OUTPATIENT
Start: 2019-11-14 | End: 2019-11-15 | Stop reason: HOSPADM

## 2019-11-14 RX ORDER — LABETALOL HYDROCHLORIDE 5 MG/ML
10 INJECTION, SOLUTION INTRAVENOUS EVERY 4 HOURS PRN
Status: DISCONTINUED | OUTPATIENT
Start: 2019-11-14 | End: 2019-11-15 | Stop reason: HOSPADM

## 2019-11-14 RX ORDER — PROPOFOL 10 MG/ML
VIAL (ML) INTRAVENOUS AS NEEDED
Status: DISCONTINUED | OUTPATIENT
Start: 2019-11-14 | End: 2019-11-14 | Stop reason: SURG

## 2019-11-14 RX ORDER — ERTAPENEM 1 G/1
1 INJECTION, POWDER, LYOPHILIZED, FOR SOLUTION INTRAMUSCULAR; INTRAVENOUS
Status: DISCONTINUED | OUTPATIENT
Start: 2019-11-14 | End: 2019-11-14 | Stop reason: SDUPTHER

## 2019-11-14 RX ORDER — PROMETHAZINE HYDROCHLORIDE 12.5 MG/1
12.5 TABLET ORAL EVERY 6 HOURS PRN
Status: DISCONTINUED | OUTPATIENT
Start: 2019-11-14 | End: 2019-11-15 | Stop reason: HOSPADM

## 2019-11-14 RX ORDER — DIPHENHYDRAMINE HCL 25 MG
25 CAPSULE ORAL NIGHTLY PRN
Status: DISCONTINUED | OUTPATIENT
Start: 2019-11-14 | End: 2019-11-15 | Stop reason: HOSPADM

## 2019-11-14 RX ORDER — PROMETHAZINE HYDROCHLORIDE 25 MG/ML
6.25 INJECTION, SOLUTION INTRAMUSCULAR; INTRAVENOUS ONCE AS NEEDED
Status: DISCONTINUED | OUTPATIENT
Start: 2019-11-14 | End: 2019-11-14 | Stop reason: HOSPADM

## 2019-11-14 RX ORDER — DIPHENHYDRAMINE HYDROCHLORIDE 50 MG/ML
25 INJECTION INTRAMUSCULAR; INTRAVENOUS NIGHTLY PRN
Status: DISCONTINUED | OUTPATIENT
Start: 2019-11-14 | End: 2019-11-15 | Stop reason: HOSPADM

## 2019-11-14 RX ORDER — FENTANYL CITRATE 50 UG/ML
50 INJECTION, SOLUTION INTRAMUSCULAR; INTRAVENOUS
Status: DISCONTINUED | OUTPATIENT
Start: 2019-11-14 | End: 2019-11-14 | Stop reason: HOSPADM

## 2019-11-14 RX ORDER — SODIUM CHLORIDE 0.9 % (FLUSH) 0.9 %
3 SYRINGE (ML) INJECTION EVERY 12 HOURS SCHEDULED
Status: DISCONTINUED | OUTPATIENT
Start: 2019-11-14 | End: 2019-11-14 | Stop reason: HOSPADM

## 2019-11-14 RX ORDER — ATRACURIUM BESYLATE 10 MG/ML
INJECTION, SOLUTION INTRAVENOUS AS NEEDED
Status: DISCONTINUED | OUTPATIENT
Start: 2019-11-14 | End: 2019-11-14 | Stop reason: SURG

## 2019-11-14 RX ORDER — DIPHENOXYLATE HYDROCHLORIDE AND ATROPINE SULFATE 2.5; .025 MG/1; MG/1
1 TABLET ORAL DAILY
Status: DISCONTINUED | OUTPATIENT
Start: 2019-11-14 | End: 2019-11-15 | Stop reason: HOSPADM

## 2019-11-14 RX ORDER — SUCCINYLCHOLINE CHLORIDE 20 MG/ML
INJECTION INTRAMUSCULAR; INTRAVENOUS AS NEEDED
Status: DISCONTINUED | OUTPATIENT
Start: 2019-11-14 | End: 2019-11-14 | Stop reason: SURG

## 2019-11-14 RX ORDER — NALOXONE HCL 0.4 MG/ML
0.4 VIAL (ML) INJECTION
Status: DISCONTINUED | OUTPATIENT
Start: 2019-11-14 | End: 2019-11-15 | Stop reason: HOSPADM

## 2019-11-14 RX ORDER — SODIUM CHLORIDE 0.9 % (FLUSH) 0.9 %
3-10 SYRINGE (ML) INJECTION AS NEEDED
Status: DISCONTINUED | OUTPATIENT
Start: 2019-11-14 | End: 2019-11-14 | Stop reason: HOSPADM

## 2019-11-14 RX ORDER — TIZANIDINE 4 MG/1
2 TABLET ORAL NIGHTLY
Status: DISCONTINUED | OUTPATIENT
Start: 2019-11-14 | End: 2019-11-15 | Stop reason: HOSPADM

## 2019-11-14 RX ORDER — FENTANYL CITRATE 50 UG/ML
INJECTION, SOLUTION INTRAMUSCULAR; INTRAVENOUS AS NEEDED
Status: DISCONTINUED | OUTPATIENT
Start: 2019-11-14 | End: 2019-11-14 | Stop reason: SURG

## 2019-11-14 RX ORDER — PROMETHAZINE HYDROCHLORIDE 25 MG/ML
12.5 INJECTION, SOLUTION INTRAMUSCULAR; INTRAVENOUS EVERY 4 HOURS PRN
Status: DISCONTINUED | OUTPATIENT
Start: 2019-11-14 | End: 2019-11-15 | Stop reason: HOSPADM

## 2019-11-14 RX ORDER — DEXAMETHASONE SODIUM PHOSPHATE 4 MG/ML
INJECTION, SOLUTION INTRA-ARTICULAR; INTRALESIONAL; INTRAMUSCULAR; INTRAVENOUS; SOFT TISSUE AS NEEDED
Status: DISCONTINUED | OUTPATIENT
Start: 2019-11-14 | End: 2019-11-14 | Stop reason: SURG

## 2019-11-14 RX ORDER — KETOROLAC TROMETHAMINE 30 MG/ML
30 INJECTION, SOLUTION INTRAMUSCULAR; INTRAVENOUS ONCE AS NEEDED
Status: COMPLETED | OUTPATIENT
Start: 2019-11-14 | End: 2019-11-14

## 2019-11-14 RX ORDER — PROMETHAZINE HYDROCHLORIDE 25 MG/ML
INJECTION, SOLUTION INTRAMUSCULAR; INTRAVENOUS AS NEEDED
Status: DISCONTINUED | OUTPATIENT
Start: 2019-11-14 | End: 2019-11-14 | Stop reason: SURG

## 2019-11-14 RX ORDER — LIDOCAINE HYDROCHLORIDE 10 MG/ML
INJECTION, SOLUTION INFILTRATION; PERINEURAL AS NEEDED
Status: DISCONTINUED | OUTPATIENT
Start: 2019-11-14 | End: 2019-11-14 | Stop reason: SURG

## 2019-11-14 RX ORDER — MEPERIDINE HYDROCHLORIDE 25 MG/ML
12.5 INJECTION INTRAMUSCULAR; INTRAVENOUS; SUBCUTANEOUS
Status: COMPLETED | OUTPATIENT
Start: 2019-11-14 | End: 2019-11-14

## 2019-11-14 RX ORDER — BISACODYL 10 MG
10 SUPPOSITORY, RECTAL RECTAL DAILY PRN
Status: DISCONTINUED | OUTPATIENT
Start: 2019-11-14 | End: 2019-11-15 | Stop reason: HOSPADM

## 2019-11-14 RX ORDER — FAMOTIDINE 10 MG/ML
20 INJECTION, SOLUTION INTRAVENOUS ONCE
Status: CANCELLED | OUTPATIENT
Start: 2019-11-14 | End: 2019-11-14

## 2019-11-14 RX ORDER — SODIUM CHLORIDE, SODIUM LACTATE, POTASSIUM CHLORIDE, CALCIUM CHLORIDE 600; 310; 30; 20 MG/100ML; MG/100ML; MG/100ML; MG/100ML
9 INJECTION, SOLUTION INTRAVENOUS CONTINUOUS
Status: DISCONTINUED | OUTPATIENT
Start: 2019-11-14 | End: 2019-11-15 | Stop reason: HOSPADM

## 2019-11-14 RX ORDER — HYDROCODONE BITARTRATE AND ACETAMINOPHEN 7.5; 325 MG/1; MG/1
2 TABLET ORAL EVERY 4 HOURS PRN
Status: DISCONTINUED | OUTPATIENT
Start: 2019-11-14 | End: 2019-11-15 | Stop reason: HOSPADM

## 2019-11-14 RX ORDER — ATROPINE SULFATE 1 MG/ML
0.5 INJECTION, SOLUTION INTRAMUSCULAR; INTRAVENOUS; SUBCUTANEOUS ONCE AS NEEDED
Status: DISCONTINUED | OUTPATIENT
Start: 2019-11-14 | End: 2019-11-14 | Stop reason: HOSPADM

## 2019-11-14 RX ORDER — HYDROCODONE BITARTRATE AND ACETAMINOPHEN 7.5; 325 MG/1; MG/1
1 TABLET ORAL EVERY 4 HOURS PRN
Status: DISCONTINUED | OUTPATIENT
Start: 2019-11-14 | End: 2019-11-15 | Stop reason: HOSPADM

## 2019-11-14 RX ORDER — FLUCONAZOLE 100 MG/1
100 TABLET ORAL ONCE
Status: COMPLETED | OUTPATIENT
Start: 2019-11-14 | End: 2019-11-14

## 2019-11-14 RX ORDER — PROMETHAZINE HYDROCHLORIDE 25 MG/1
25 TABLET ORAL ONCE AS NEEDED
Status: DISCONTINUED | OUTPATIENT
Start: 2019-11-14 | End: 2019-11-14 | Stop reason: HOSPADM

## 2019-11-14 RX ORDER — OXYCODONE HYDROCHLORIDE AND ACETAMINOPHEN 5; 325 MG/1; MG/1
1 TABLET ORAL EVERY 4 HOURS PRN
Status: DISCONTINUED | OUTPATIENT
Start: 2019-11-14 | End: 2019-11-15 | Stop reason: HOSPADM

## 2019-11-14 RX ORDER — LIDOCAINE HYDROCHLORIDE 10 MG/ML
0.5 INJECTION, SOLUTION EPIDURAL; INFILTRATION; INTRACAUDAL; PERINEURAL ONCE AS NEEDED
Status: COMPLETED | OUTPATIENT
Start: 2019-11-14 | End: 2019-11-14

## 2019-11-14 RX ORDER — OXYCODONE HYDROCHLORIDE AND ACETAMINOPHEN 5; 325 MG/1; MG/1
2 TABLET ORAL EVERY 4 HOURS PRN
Status: DISCONTINUED | OUTPATIENT
Start: 2019-11-14 | End: 2019-11-15 | Stop reason: HOSPADM

## 2019-11-14 RX ORDER — HYDROMORPHONE HYDROCHLORIDE 1 MG/ML
0.5 INJECTION, SOLUTION INTRAMUSCULAR; INTRAVENOUS; SUBCUTANEOUS
Status: DISCONTINUED | OUTPATIENT
Start: 2019-11-14 | End: 2019-11-14 | Stop reason: HOSPADM

## 2019-11-14 RX ADMIN — FENTANYL CITRATE 100 MCG: 50 INJECTION, SOLUTION INTRAMUSCULAR; INTRAVENOUS at 12:31

## 2019-11-14 RX ADMIN — PROPOFOL 25 MCG/KG/MIN: 10 INJECTION, EMULSION INTRAVENOUS at 12:43

## 2019-11-14 RX ADMIN — ERTAPENEM SODIUM 1 G: 1 INJECTION, POWDER, LYOPHILIZED, FOR SOLUTION INTRAMUSCULAR; INTRAVENOUS at 17:25

## 2019-11-14 RX ADMIN — POTASSIUM CHLORIDE, DEXTROSE MONOHYDRATE AND SODIUM CHLORIDE 50 ML/HR: 150; 5; 450 INJECTION, SOLUTION INTRAVENOUS at 17:26

## 2019-11-14 RX ADMIN — ATRACURIUM BESYLATE 5 MG: 10 INJECTION, SOLUTION INTRAVENOUS at 12:31

## 2019-11-14 RX ADMIN — FAMOTIDINE 20 MG: 20 TABLET ORAL at 11:15

## 2019-11-14 RX ADMIN — PROMETHAZINE HYDROCHLORIDE 12.5 MG: 12.5 TABLET ORAL at 15:49

## 2019-11-14 RX ADMIN — HYDROMORPHONE HYDROCHLORIDE 0.5 MG: 1 INJECTION, SOLUTION INTRAMUSCULAR; INTRAVENOUS; SUBCUTANEOUS at 13:39

## 2019-11-14 RX ADMIN — OXYCODONE HYDROCHLORIDE AND ACETAMINOPHEN 2 TABLET: 5; 325 TABLET ORAL at 15:49

## 2019-11-14 RX ADMIN — PROMETHAZINE HYDROCHLORIDE 6.25 MG: 25 INJECTION INTRAMUSCULAR; INTRAVENOUS at 13:00

## 2019-11-14 RX ADMIN — KETOROLAC TROMETHAMINE 30 MG: 30 INJECTION, SOLUTION INTRAMUSCULAR; INTRAVENOUS at 13:51

## 2019-11-14 RX ADMIN — FLUCONAZOLE 100 MG: 100 TABLET ORAL at 20:26

## 2019-11-14 RX ADMIN — TOPIRAMATE 200 MG: 100 TABLET, FILM COATED ORAL at 22:15

## 2019-11-14 RX ADMIN — FENTANYL CITRATE 50 MCG: 0.05 INJECTION, SOLUTION INTRAMUSCULAR; INTRAVENOUS at 13:30

## 2019-11-14 RX ADMIN — DEXAMETHASONE SODIUM PHOSPHATE 8 MG: 4 INJECTION, SOLUTION INTRAMUSCULAR; INTRAVENOUS at 12:31

## 2019-11-14 RX ADMIN — SUCCINYLCHOLINE CHLORIDE 100 MG: 20 INJECTION, SOLUTION INTRAMUSCULAR; INTRAVENOUS; PARENTERAL at 12:31

## 2019-11-14 RX ADMIN — LIDOCAINE HYDROCHLORIDE 50 MG: 10 INJECTION, SOLUTION INFILTRATION; PERINEURAL at 12:31

## 2019-11-14 RX ADMIN — HYDROMORPHONE HYDROCHLORIDE 0.5 MG: 1 INJECTION, SOLUTION INTRAMUSCULAR; INTRAVENOUS; SUBCUTANEOUS at 13:20

## 2019-11-14 RX ADMIN — LIDOCAINE HYDROCHLORIDE 0.2 ML: 10 INJECTION, SOLUTION EPIDURAL; INFILTRATION; INTRACAUDAL; PERINEURAL at 11:01

## 2019-11-14 RX ADMIN — MEPERIDINE HYDROCHLORIDE 12.5 MG: 25 INJECTION INTRAMUSCULAR; INTRAVENOUS; SUBCUTANEOUS at 13:54

## 2019-11-14 RX ADMIN — OXYCODONE HYDROCHLORIDE AND ACETAMINOPHEN 2 TABLET: 5; 325 TABLET ORAL at 21:50

## 2019-11-14 RX ADMIN — FENTANYL CITRATE 50 MCG: 0.05 INJECTION, SOLUTION INTRAMUSCULAR; INTRAVENOUS at 13:25

## 2019-11-14 RX ADMIN — PROPOFOL 200 MG: 10 INJECTION, EMULSION INTRAVENOUS at 12:31

## 2019-11-14 RX ADMIN — TIZANIDINE 2 MG: 4 TABLET ORAL at 22:16

## 2019-11-14 RX ADMIN — MEPERIDINE HYDROCHLORIDE 12.5 MG: 25 INJECTION INTRAMUSCULAR; INTRAVENOUS; SUBCUTANEOUS at 13:47

## 2019-11-14 RX ADMIN — SODIUM CHLORIDE, POTASSIUM CHLORIDE, SODIUM LACTATE AND CALCIUM CHLORIDE 9 ML/HR: 600; 310; 30; 20 INJECTION, SOLUTION INTRAVENOUS at 11:01

## 2019-11-14 NOTE — INTERVAL H&P NOTE
[facility] Pre-op    Full history and physical note from office is up to date.  See office note attached.    /87 (BP Location: Right arm, Patient Position: Lying)   Pulse 93   Temp 98.4 °F (36.9 °C) (Temporal)   Resp 18   SpO2 98%     (Per Anesthesiologist)  CV:  S1S2 regular, no murmur  Resp:  CTAB    LAB Results:  Lab Results   Component Value Date    WBC 8.54 11/04/2019    HGB 13.6 11/04/2019    HCT 43.0 11/04/2019    MCV 96.6 11/04/2019     11/04/2019    NEUTROABS 5.19 11/04/2019    GLUCOSE 78 11/04/2019    BUN 16 11/04/2019    CREATININE 0.73 11/04/2019    EGFRIFNONA 85 11/04/2019     11/04/2019    K 3.7 11/04/2019     11/04/2019    CO2 24.0 11/04/2019    CALCIUM 9.3 11/04/2019    ALBUMIN 4.30 11/04/2019    AST 17 11/04/2019    ALT 11 11/04/2019    BILITOT 0.3 11/04/2019       Cancer Staging (if applicable)  Cancer Patient: __ yes _x_no __unknown__N/A; If yes, clinical stage T:__ N:__M:__, stage group or __N/A    Cami Maher, APRN 11/14/2019 11:09 AM

## 2019-11-14 NOTE — ANESTHESIA PREPROCEDURE EVALUATION
Anesthesia Evaluation     Patient summary reviewed and Nursing notes reviewed                Airway   Mallampati: II  TM distance: >3 FB  Neck ROM: full  No difficulty expected  Dental - normal exam     Pulmonary - negative pulmonary ROS and normal exam   Cardiovascular - negative cardio ROS and normal exam        Neuro/Psych- negative ROS  GI/Hepatic/Renal/Endo    (+)  GERD,      Musculoskeletal (-) negative ROS    Abdominal  - normal exam    Bowel sounds: normal.   Substance History - negative use     OB/GYN negative ob/gyn ROS         Other                        Anesthesia Plan    ASA 2     general     intravenous induction     Anesthetic plan, all risks, benefits, and alternatives have been provided, discussed and informed consent has been obtained with: patient.    Plan discussed with CRNA.

## 2019-11-14 NOTE — BRIEF OP NOTE
Orthopedics I&D left ankle  Brief Op Note    Ondina Loo  11/14/2019    Pre-op Diagnosis: left ankle puncture wound, abscess    Post-op Diagnosis:  same       Post-Op Diagnosis Codes:     * Puncture wound of left ankle, initial encounter [S91.032A]    Procedure(s):  I&D left ankle    Surgeon(s):  Jennifer Vidal MD    Anesthesia:  General with Block    Staff:   Circulator: Usman Earl RN  Scrub Person: Derrick Barker RN    Estimated Blood Loss:5cc      Specimens: culture and permanent      Drains:  none    Complications:  None    Tourniquet:: 6min    Dressing:splint    Disposition:rr stable    Jennifer Vidal MD     Date: 11/14/2019  Time: 1:05 PM

## 2019-11-14 NOTE — H&P
Patient Name: Ondina Loo  MRN: [id number]  : 1970  DOS: 2019    Attending: Jennifer Barillas MD    Primary Care Provider: Mariya Mercedes MD      Chief complaint:  Left ankle puncture wound    Subjective   Patient is a 49 y.o. female presented for I&D left medial ankle wound by Dr. Barillas.    She underwent surgery under [facility]. She tolerated surgery well and is admitted for further medical management. She had sting ray injury on 19. She has had previous wound debridement 10/15/19 and has been on abx.      Per Dr. 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  We have irrigated debrided it and remove foreign body x1, she has been on antibiotics.  The wound has failed to completely heal.  There is a central eschar.  It has drainage and about 1 cm of erythema.  Because of the concern of possible recurrence of infection and development of osteomyelitis I recommended repeat irrigation and debridement.  At the time of surgery there is about 1 cm ring of erythema, there is some central eschar in the wound.  Deep there was some granulation tissue.  But there is no exposure of bone.  There is no identifiable foreign body.))    She is followed by Dr. Chance Herrera, [facility], for abx management. She complains of yeast infection unrelieved with OTC meds.    When seen postop she is having increased pain, RN at bedside with pain medication. She denies nausea, shortness of breath or chest pain. No hx of DVT or PE.      Allergies:  Allergies   Allergen Reactions   • Zofran [Ondansetron Hcl] Other (See Comments)     Severely constipated       Meds:  Medications Prior to Admission   Medication Sig Dispense Refill Last Dose   • doxycycline (MONODOX) 100 MG capsule Take 100 mg by mouth 2 (Two) Times a Day.  0 2019 at 2000   • moxifloxacin (AVELOX) 400 MG tablet Take 400 mg by mouth Daily.  0 2019 at 1700   • tiZANidine (ZANAFLEX) 2 MG  tablet Take 2 mg by mouth Every Night.  0 11/13/2019 at 2330   • topiramate (TOPAMAX) 100 MG tablet Take 200 mg by mouth Every Night.   11/13/2019 at 2330   • HYDROcodone-acetaminophen (NORCO) 7.5-325 MG per tablet Take 1-2 tablets by mouth Every 6 (Six) Hours As Needed for Moderate Pain  (as needed for pain). 45 tablet 0 More than a month at Unknown time   • naratriptan (AMERGE) 1 MG tablet Take 1 mg by mouth 1 (One) Time As Needed for Migraine.   More than a month at Unknown time   • oxyCODONE-acetaminophen (PERCOCET) 5-325 MG per tablet Take 1-2 tablets by mouth Every 6 (Six) Hours As Needed for Severe Pain  (as needed for severe pain). 45 tablet 0 More than a month at Unknown time   • promethazine (PHENERGAN) 12.5 MG tablet Take 1 tablet by mouth Every 6 (Six) Hours As Needed for Nausea or Vomiting. 15 tablet 0 More than a month at Unknown time   • promethazine (PHENERGAN) 12.5 MG tablet Take 1 tablet by mouth Every 6 (Six) Hours As Needed for Nausea or Vomiting. 30 tablet 0 More than a month at Unknown time   • rizatriptan (MAXALT) 10 MG tablet Take 10 mg by mouth 1 (One) Time As Needed.  1 More than a month at Unknown time   • SUMAtriptan (IMITREX) 100 MG tablet Take 100 mg by mouth 1 (One) Time As Needed for Migraine.   10/31/2019       History:   Past Medical History:   Diagnosis Date   • Constipation     due to loop in bowel-HISTORY OF NO CURRENT ISSUES   • GERD (gastroesophageal reflux disease)    • Migraines    • Wears glasses      Past Surgical History:   Procedure Laterality Date   • BREAST AUGMENTATION     • BREAST CAPSULOTOMY, IMPLANT REVISION Bilateral 5/8/2018    Procedure: BILATERAL CAPSULECTOMY AND BREAST IMPLANT REMOVAL;  Surgeon: Eileen Lares MD;  Location:  [facility] OR;  Service: Plastics   • COLONOSCOPY  2018   • INCISION AND DRAINAGE LEG Left 10/15/2019    Procedure: I&D LEFT ANKLE PUNCTURE WOUND;  Surgeon: Jennifer Vidal MD;  Location:  [facility] OR;  Service: Orthopedics   • OOPHORECTOMY  Left      History reviewed. No pertinent family history.  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    Review of Systems  Pertinent items are noted in HPI, all other systems reviewed and negative    Vital Signs  Visit Vitals  /81 (Patient Position: Lying)   Pulse 59   Temp 97.6 °F (36.4 °C) (Oral)   Resp 18   LMP 08/14/2019   SpO2 99%       Physical Exam:    General Appearance:    Alert, cooperative, in no acute distress   Head:    Normocephalic, without obvious abnormality, atraumatic   Eyes:            Lids and lashes normal, conjunctivae and sclerae normal, no   icterus, no pallor, corneas clear   Ears:    Ears appear intact with no abnormalities noted   Neck:   No adenopathy, supple, trachea midline, no thyromegaly   Lungs:     Clear to auscultation, respirations regular, even and                   unlabored    Heart:    Regular rhythm and normal rate, normal S1 and S2, no            murmur, no gallop   Abdomen:     Normal bowel sounds, no masses, no organomegaly, soft        non-tender, non-distended, no guarding, no rebound                 tenderness   Genitalia:    Deferred   Extremities:   Left foot ACE wrap CDI. Able to wiggle toes, good cap refill.   Pulses:   Pulses palpable and equal bilaterally   Skin:   No bleeding, bruising or rash   Neurologic:   Cranial nerves 2 - 12 grossly intact, sensation intact      I reviewed the patient's new clinical results.     Results for SOCO CARR (MRN [id number]) as of 11/14/2019 17:56   Ref. Range 11/4/2019 15:26   Glucose Latest Ref Range: 65 - 99 mg/dL 78   Sodium Latest Ref Range: 136 - 145 mmol/L 141   Potassium Latest Ref Range: 3.5 - 5.2 mmol/L 3.7   CO2 Latest Ref Range: 22.0 - 29.0 mmol/L 24.0   Chloride Latest Ref Range: 98 - 107 mmol/L 103   Anion Gap Latest  Ref Range: 5.0 - 15.0 mmol/L 14.0   Creatinine Latest Ref Range: 0.57 - 1.00 mg/dL 0.73   BUN Latest Ref Range: 6 - 20 mg/dL 16   BUN/Creatinine Ratio Latest Ref Range: 7.0 - 25.0  21.9   Calcium Latest Ref Range: 8.6 - 10.5 mg/dL 9.3   eGFR Non  Am Latest Ref Range: >60 mL/min/1.73 85   Alkaline Phosphatase Latest Ref Range: 39 - 117 U/L 74   Total Protein Latest Ref Range: 6.0 - 8.5 g/dL 7.6   ALT (SGPT) Latest Ref Range: 1 - 33 U/L 11   AST (SGOT) Latest Ref Range: 1 - 32 U/L 17   Total Bilirubin Latest Ref Range: 0.2 - 1.2 mg/dL 0.3   Albumin Latest Ref Range: 3.50 - 5.20 g/dL 4.30   Globulin Latest Units: gm/dL 3.3   A/G Ratio Latest Units: g/dL 1.3   C-Reactive Protein Latest Ref Range: 0.00 - 0.50 mg/dL 0.07   WBC Latest Ref Range: 3.40 - 10.80 10*3/mm3 8.54   RBC Latest Ref Range: 3.77 - 5.28 10*6/mm3 4.45   Hemoglobin Latest Ref Range: 12.0 - 15.9 g/dL 13.6   Hematocrit Latest Ref Range: 34.0 - 46.6 % 43.0   RDW Latest Ref Range: 12.3 - 15.4 % 13.2   MCV Latest Ref Range: 79.0 - 97.0 fL 96.6   MCH Latest Ref Range: 26.6 - 33.0 pg 30.6   MCHC Latest Ref Range: 31.5 - 35.7 g/dL 31.6   MPV Latest Ref Range: 6.0 - 12.0 fL 10.1   Platelets Latest Ref Range: 140 - 450 10*3/mm3 374     Assessment and Plan:     Status post incision and drainage left medial ankle wound    Puncture wound of left ankle    Aftercare following surgery of the musculoskeletal system      Plan  1. PT/OT- NWB LLE  2. Pain control-prns   3. IS-encourage  4. DVT proph- mechs/Lovenox  5. Bowel regimen  6. Resume home medications as appropriate  7. Monitor post-op labs  8. Discharge planning for home    [facility], Dr. Herrera consult for abx management       Mariya KARTHIK Ovalle  11/14/19  5:58 PM     I have personally performed the evaluation on this patient. My history is consistent  with HPI obtained. My exam findings are listed above. I have personally reviewed and discussed the above formulated treatment plan with pt and AH.  Racquel

## 2019-11-14 NOTE — ANESTHESIA POSTPROCEDURE EVALUATION
Patient: Ondina Loo    Procedure Summary     Date:  11/14/19 Room / Location:   [facility]  [facility]  [facility]    Anesthesia Start:  1227 Anesthesia Stop:      Procedure:  I&D left ankle (Left ) Diagnosis:       Puncture wound of left ankle, initial encounter      (Puncture wound of left ankle, initial encounter [S91.032A])    Surgeon:  Jennifer Vidal MD Provider:  Ryan Douglas MD    Anesthesia Type:  general ASA Status:  2          Anesthesia Type: general  Last vitals  /76   Temp 97.7   Pulse 97   Resp 18   SpO2 98     Post Anesthesia Care and Evaluation    Patient location during evaluation: PACU  Patient participation: complete - patient participated  Level of consciousness: awake and alert  Pain score: 0  Pain management: adequate  Airway patency: patent  Anesthetic complications: No anesthetic complications  PONV Status: none  Cardiovascular status: hemodynamically stable and acceptable  Respiratory status: nonlabored ventilation, acceptable and nasal cannula  Hydration status: acceptable

## 2019-11-14 NOTE — ANESTHESIA PROCEDURE NOTES
Airway  Urgency: elective    Date/Time: 11/14/2019 12:41 PM  Airway not difficult    General Information and Staff    Patient location during procedure: OR  CRNA: Maury Jean-Baptiste CRNA    Indications and Patient Condition  Indications for airway management: airway protection    Preoxygenated: yes  MILS not maintained throughout  Mask difficulty assessment: 1 - vent by mask    Final Airway Details  Final airway type: endotracheal airway      Successful airway: ETT  Cuffed: yes   Successful intubation technique: direct laryngoscopy and RSI  Facilitating devices/methods: cricoid pressure  Endotracheal tube insertion site: oral  Blade: Nuria  Blade size: 3  ETT size (mm): 6.5  Cormack-Lehane Classification: grade I - full view of glottis  Placement verified by: chest auscultation and capnometry   Cuff volume (mL): 8  Measured from: lips  ETT/EBT  to lips (cm): 20  Number of attempts at approach: 1  Assessment: lips, teeth, and gum same as pre-op and atraumatic intubation    Additional Comments  Negative epigastric sounds, Breath sound equal bilaterally with symmetric chest rise and fall

## 2019-11-14 NOTE — PROGRESS NOTES
Orthopedic Foot/Ankle Progress Note    Subjective     Post-Op:I&D left ankle  Systemic or Specific Complaints: some pain  Objective     Vital signs in last 24 hours:  Temp:  [97.7 °F (36.5 °C)-98.4 °F (36.9 °C)] 97.7 °F (36.5 °C)  Heart Rate:  [55-98] 55  Resp:  [16-18] 16  BP: (120-143)/(72-87) 126/80    Neurovascular: toes wiggle left foot               Wound: left splint dry    Data Review  Lab Results (last 24 hours)     Procedure Component Value Units Date/Time    Anaerobic Culture - Tissue, Ankle, Left [[id number]] Collected:  11/14/19 1248    Specimen:  Tissue from Ankle, Left Updated:  11/14/19 1328    Tissue / Bone Culture - Tissue, Ankle, Left [[id number]] Collected:  11/14/19 1248    Specimen:  Tissue from Ankle, Left Updated:  11/14/19 1328    Fungus Culture - Tissue, Ankle, Left [[id number]] Collected:  11/14/19 1248    Specimen:  Tissue from Ankle, Left Updated:  11/14/19 1328    AFB Culture - Tissue, Ankle, Left [[id number]] Collected:  11/14/19 1248    Specimen:  Tissue from Ankle, Left Updated:  11/14/19 1328    POC Urine Pregnancy Test [[id number]]  (Normal) Collected:  11/14/19 1124    Specimen:  Urine Updated:  11/14/19 1124     HCG, Urine, QL Negative     Lot Number [id number]     Internal Positive Control Positive     Internal Negative Control Negative            Assessment/Plan     Status post- I&D left ankle puncture wound-  Elevate, splint, non-wt bearing, Dr Herrera will see her for antibiotics           Jennifer Vidal MD    Date: 11/14/2019  Time: 2:18 PM

## 2019-11-14 NOTE — OP NOTE
Operative Report    11/14/19  1:06 PM    Preoperative diagnosis: left ankle puncture wound, weeping wound, small abscess    Postoperative diagnosis: Same    Anesthesia: General with blocks for postop pain control    Surgeon: Jennifer Vidal M.D.    Assistant: bhumi BRADFORD, present for the entire procedure including prepping, draping, retraction, closure, dressing.    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  We have irrigated debrided it and remove foreign body x1, she has been on antibiotics.  The wound has failed to completely heal.  There is a central eschar.  It has drainage and about 1 cm of erythema.  Because of the concern of possible recurrence of infection and development of osteomyelitis I recommended repeat irrigation and debridement.  At the time of surgery there is about 1 cm ring of erythema, there is some central eschar in the wound.  Deep there was some granulation tissue.  But there is no exposure of bone.  There is no identifiable foreign body.    Operative procedure: Patient was taken to the operating room where general anesthesia was induced without difficulty.  Antibiotics were held until postop.  The left leg was prepped and draped in the usual sterile fashion with a well-padded tourniquet on the thigh.  The appropriate timeout was called the leg was elevated and the tourniquet inflated to 350 mmHg.  Tourniquet time was 6 minutes.  I excised the previous 2 cm incision over the medial malleolus including the central eschar.  It was some granulation tissue.  The bone was completely covered by periosteum there was no evidence for any continuation to the bone.  I remove the granulation tissue and we sent cultures.  Permanent was also sent including the eschar.  I carefully explored the area, the incision was extended 1 cm proximal and distal.  There was no further  abnormal tissue.  The tourniquet was released.  The wound was irrigated copiously with antibiotic solution using pulsatile lavage.  It was closed loosely with nylon.  It was dressed sterilely and she was placed in a posterior splint.  She was awakened extubated and transferred to recovery room in stable condition.  Postop plan will be admission for IV antibiotics elevation and nonweightbearing.    Estimated blood loss: 5 cc    Specimens: Cultures and permanent    Drains: None    Complications: None    Jennifer Vidal MD  11/14/19  1:06 PM

## 2019-11-15 VITALS
HEART RATE: 66 BPM | TEMPERATURE: 98 F | DIASTOLIC BLOOD PRESSURE: 69 MMHG | SYSTOLIC BLOOD PRESSURE: 116 MMHG | RESPIRATION RATE: 14 BRPM | OXYGEN SATURATION: 98 %

## 2019-11-15 LAB
ANION GAP SERPL CALCULATED.3IONS-SCNC: 8 MMOL/L (ref 5–15)
BUN BLD-MCNC: 10 MG/DL (ref 6–20)
BUN/CREAT SERPL: 15.9 (ref 7–25)
CALCIUM SPEC-SCNC: 8.8 MG/DL (ref 8.6–10.5)
CHLORIDE SERPL-SCNC: 106 MMOL/L (ref 98–107)
CO2 SERPL-SCNC: 23 MMOL/L (ref 22–29)
CREAT BLD-MCNC: 0.63 MG/DL (ref 0.57–1)
DEPRECATED RDW RBC AUTO: 48.3 FL (ref 37–54)
ERYTHROCYTE [DISTWIDTH] IN BLOOD BY AUTOMATED COUNT: 13.5 % (ref 12.3–15.4)
GFR SERPL CREATININE-BSD FRML MDRD: 101 ML/MIN/1.73
GLUCOSE BLD-MCNC: 129 MG/DL (ref 65–99)
HCT VFR BLD AUTO: 38.1 % (ref 34–46.6)
HGB BLD-MCNC: 12.2 G/DL (ref 12–15.9)
MCH RBC QN AUTO: 31.2 PG (ref 26.6–33)
MCHC RBC AUTO-ENTMCNC: 32 G/DL (ref 31.5–35.7)
MCV RBC AUTO: 97.4 FL (ref 79–97)
PLATELET # BLD AUTO: 260 10*3/MM3 (ref 140–450)
PMV BLD AUTO: 10.2 FL (ref 6–12)
POTASSIUM BLD-SCNC: 3.7 MMOL/L (ref 3.5–5.2)
RBC # BLD AUTO: 3.91 10*6/MM3 (ref 3.77–5.28)
SODIUM BLD-SCNC: 137 MMOL/L (ref 136–145)
WBC NRBC COR # BLD: 11.76 10*3/MM3 (ref 3.4–10.8)

## 2019-11-15 PROCEDURE — 97161 PT EVAL LOW COMPLEX 20 MIN: CPT

## 2019-11-15 PROCEDURE — G0378 HOSPITAL OBSERVATION PER HR: HCPCS

## 2019-11-15 PROCEDURE — 85027 COMPLETE CBC AUTOMATED: CPT | Performed by: NURSE PRACTITIONER

## 2019-11-15 PROCEDURE — 97110 THERAPEUTIC EXERCISES: CPT

## 2019-11-15 PROCEDURE — 25010000002 DAPTOMYCIN PER 1 MG: Performed by: INTERNAL MEDICINE

## 2019-11-15 PROCEDURE — 80048 BASIC METABOLIC PNL TOTAL CA: CPT | Performed by: NURSE PRACTITIONER

## 2019-11-15 PROCEDURE — 63710000001 PROMETHAZINE PER 12.5 MG: Performed by: NURSE PRACTITIONER

## 2019-11-15 PROCEDURE — 99024 POSTOP FOLLOW-UP VISIT: CPT | Performed by: ORTHOPAEDIC SURGERY

## 2019-11-15 RX ORDER — ASPIRIN 81 MG/1
81 TABLET, CHEWABLE ORAL DAILY
Status: DISCONTINUED | OUTPATIENT
Start: 2019-11-15 | End: 2019-11-15 | Stop reason: HOSPADM

## 2019-11-15 RX ORDER — DOXYCYCLINE 100 MG/1
100 CAPSULE ORAL EVERY 12 HOURS SCHEDULED
Status: DISCONTINUED | OUTPATIENT
Start: 2019-11-15 | End: 2019-11-15 | Stop reason: HOSPADM

## 2019-11-15 RX ORDER — ASPIRIN 81 MG/1
81 TABLET, CHEWABLE ORAL DAILY
Qty: 30 TABLET | Refills: 0 | Status: SHIPPED | OUTPATIENT
Start: 2019-11-15 | End: 2021-02-09

## 2019-11-15 RX ORDER — FLUCONAZOLE 100 MG/1
100 TABLET ORAL EVERY 24 HOURS
Status: DISCONTINUED | OUTPATIENT
Start: 2019-11-15 | End: 2019-11-15

## 2019-11-15 RX ORDER — DOCUSATE SODIUM 100 MG/1
100 CAPSULE, LIQUID FILLED ORAL 2 TIMES DAILY
Qty: 60 CAPSULE | Refills: 0
Start: 2019-11-15 | End: 2021-02-09

## 2019-11-15 RX ADMIN — DAPTOMYCIN 350 MG: 500 INJECTION, POWDER, LYOPHILIZED, FOR SOLUTION INTRAVENOUS at 08:28

## 2019-11-15 RX ADMIN — PROMETHAZINE HYDROCHLORIDE 12.5 MG: 12.5 TABLET ORAL at 08:28

## 2019-11-15 RX ADMIN — ASPIRIN 81 MG 81 MG: 81 TABLET ORAL at 13:23

## 2019-11-15 RX ADMIN — DOXYCYCLINE 100 MG: 100 CAPSULE ORAL at 13:23

## 2019-11-15 RX ADMIN — HYDROCODONE BITARTRATE AND ACETAMINOPHEN 2 TABLET: 7.5; 325 TABLET ORAL at 08:28

## 2019-11-15 RX ADMIN — OXYCODONE HYDROCHLORIDE AND ACETAMINOPHEN 2 TABLET: 5; 325 TABLET ORAL at 02:28

## 2019-11-15 RX ADMIN — PROMETHAZINE HYDROCHLORIDE 12.5 MG: 12.5 TABLET ORAL at 02:29

## 2019-11-15 RX ADMIN — MULTIVITAMIN TABLET 1 TABLET: TABLET at 08:28

## 2019-11-15 NOTE — DISCHARGE INSTRUCTIONS
1. Do not put weight on operated foot, use crutches, wheelchair,  walker or knee walker  2. Elevate operated foot over heart.  Keep pressure off the heel, put pillows under the calf and let the heel hang free so there is no pressure from the splint on the heel.    3. Keep the splint dry and intact- the ace bandage may be tightened or loosened, but do not remove the splint underneath the ace bandage.    4. Call the office if any problems: [phone]  5. Take the Zofran with the pain meds if you have nausea/vomiting  6. Take aspirin 81mg to help prevent blood clots

## 2019-11-15 NOTE — PLAN OF CARE
Problem: Patient Care Overview  Goal: Plan of Care Review  Outcome: Ongoing (interventions implemented as appropriate)   11/14/19 4165   Coping/Psychosocial   Plan of Care Reviewed With patient   Plan of Care Review   Progress improving   OTHER   Outcome Summary Patient alert and oriented. VSS. Transfers well with standby assist. No issues.

## 2019-11-15 NOTE — THERAPY DISCHARGE NOTE
Patient Name: Ondina Loo  : 1970    MRN: [id number]                              Today's Date: 11/15/2019       Admit Date: 2019    Visit Dx:     ICD-10-CM ICD-9-CM   1. Puncture wound of left ankle, initial encounter S91.032A 891.0     Patient Active Problem List   Diagnosis   • Puncture wound of left ankle with complication   • Puncture wound of left ankle   • Aftercare following surgery of the musculoskeletal system   • Status post incision and drainage left medial ankle wound     Past Medical History:   Diagnosis Date   • Constipation     due to loop in bowel-HISTORY OF NO CURRENT ISSUES   • GERD (gastroesophageal reflux disease)    • Migraines    • Wears glasses      Past Surgical History:   Procedure Laterality Date   • BREAST AUGMENTATION     • BREAST CAPSULOTOMY, IMPLANT REVISION Bilateral 2018    Procedure: BILATERAL CAPSULECTOMY AND BREAST IMPLANT REMOVAL;  Surgeon: Eileen Lares MD;  Location:  [facility];  Service: Plastics   • COLONOSCOPY  2018   • INCISION AND DRAINAGE LEG Left 10/15/2019    Procedure: I&D LEFT ANKLE PUNCTURE WOUND;  Surgeon: Jennifer Vidal MD;  Location:  [facility];  Service: Orthopedics   • INCISION AND DRAINAGE LEG Left 2019    Procedure: INCISION AND DRAINAGE LEFT ANKLE;  Surgeon: Jennifer Vidal MD;  Location:  [facility];  Service: Orthopedics   • OOPHORECTOMY Left      General Information     Row Name 11/15/19 1146          PT Evaluation Time/Intention    Document Type  evaluation  -EJ     Mode of Treatment  physical therapy  -EJ     Row Name 11/15/19 1146          General Information    Patient Profile Reviewed?  yes  -EJ     Prior Level of Function  independent:;all household mobility was WB in boot, is now NWB.  -EJ     Existing Precautions/Restrictions  fall  -EJ     Row Name 11/15/19 1146          Relationship/Environment    Lives With  spouse  -EJ     Row Name 11/15/19 1146          Resource/Environmental Concerns    Current Living  Arrangements  home/apartment/condo  -     Row Name 11/15/19 1146          Home Main Entrance    Number of Stairs, Main Entrance  three  -EJ     Stair Railings, Main Entrance  railings safe and in good condition;railings on both sides of stairs can not reach both railings at once  -     Row Name 11/15/19 1146          Cognitive Assessment/Intervention- PT/OT    Orientation Status (Cognition)  oriented x 4  -     Row Name 11/15/19 1146          Safety Issues, Functional Mobility    Impairments Affecting Function (Mobility)  pain;balance;strength  -EJ       User Key  (r) = Recorded By, (t) = Taken By, (c) = Cosigned By    Initials Name Provider Type     Noreen Gee PT Physical Therapist        Mobility     Row Name 11/15/19 1304          Bed Mobility Assessment/Treatment    Bed Mobility Assessment/Treatment  bed mobility (all) activities  -EJ     Caroleen Level (Bed Mobility)  independent  -[facility] Name 11/15/19 1304          Transfer Assessment/Treatment    Comment (Transfers)  with rolling knee walker and crutches.  -[facility] Name 11/15/19 1304          Sit-Stand Transfer    Sit-Stand Caroleen (Transfers)  supervision  -[facility] Name 11/15/19 1304          Gait/Stairs Assessment/Training    Gait/Stairs Assessment/Training  gait/ambulation independence  -EJ     Caroleen Level (Gait)  supervision  -EJ     Assistive Device (Gait)  crutches, axillary;walker, knee scooter  -EJ     Distance in Feet (Gait)  120 with each AD  -EJ     Pattern (Gait)  swing-through  -EJ     Caroleen Level (Stairs)  contact guard  -EJ     Assistive Device (Stairs)  crutches, axillary  -EJ     Number of Steps (Stairs)  7  -EJ     Ascending Technique (Stairs)  step-to-step  -EJ     Descending Technique (Stairs)  step-to-step  -EJ     Comment (Gait/Stairs)  PT demos use of knee walker, crutches prior to pt use. Pt educated on fit of knee walker, crutches. Pt performs activities with quickly progressing  independence. Occasionally needs cues with use of 1 crutch 1 HR on stairs. Pt issued pair of crutches.  -[facility] Name 11/15/19 1304          Mobility Assessment/Intervention    Extremity Weight-bearing Status  left lower extremity  -     Left Lower Extremity (Weight-bearing Status)  non weight-bearing (NWB)  -       User Key  (r) = Recorded By, (t) = Taken By, (c) = Cosigned By    Initials Name Provider Type     Noreen Gee PT Physical Therapist        Obj/Interventions     Row Name 11/15/19 1308          General ROM    GENERAL ROM COMMENTS  WFL  other than LLE ankle in wrap with splint.  -EJ     Row Name 11/15/19 1308          MMT (Manual Muscle Testing)    General MMT Comments  RLE WNL, LLE functionally 4+/5. pt c/o decreased endurance with extended use of LLE.  -EJ     Row Name 11/15/19 1308          Therapeutic Exercise    Lower Extremity (Therapeutic Exercise)  gluteal sets;quad sets, bilateral;SLR (straight leg raise), bilateral education for progression of SLR.  -     Lower Extremity Range of Motion (Therapeutic Exercise)  hip abduction/adduction, bilateral education on adduction isometric  -     Exercise Type (Therapeutic Exercise)  AROM (active range of motion)  -     Position (Therapeutic Exercise)  seated  -[facility] 11/15/19 1308          Static Sitting Balance    Level of [place] (Unsupported Sitting, Static Balance)  independent  -EJ     Row Name 11/15/19 1308          Dynamic Sitting Balance    Level of [place], Reaches Outside Midline (Sitting, Dynamic Balance)  independent  -EJ     Row Name 11/15/19 1308          Static Standing Balance    Level of [place] (Supported Standing, Static Balance)  independent  -EJ     Row Name 11/15/19 1308          Dynamic Standing Balance    Level of [place], Reaches Outside Midline (Standing, Dynamic Balance)  supervision  -EJ     Row Name 11/15/19 1308          Sensory Assessment/Intervention    Sensory General  Assessment  no sensation deficits identified LLE foot not assessed  -EJ       User Key  (r) = Recorded By, (t) = Taken By, (c) = Cosigned By    Initials Name Provider Type    Noreen Catherine PT Physical Therapist        Goals/Plan     Row Name 11/15/19 1312          Patient Education Goal (PT)    Activity (Patient Education Goal, PT)  HEP, gait training with knee scooter and axillary crutches.   -EJ     Monitor/Cues/Accuracy (Memory Goal 2, PT)  demonstrates adequately;verbalizes understanding  -EJ     Time Frame (Patient Education Goal, PT)  short term goal (STG);1 day  -EJ     Progress/Outcome (Patient Education Goal, PT)  goal met  -EJ       User Key  (r) = Recorded By, (t) = Taken By, (c) = Cosigned By    Initials Name Provider Type    Noreen Catherine PT Physical Therapist        Clinical Impression     [facility] Name 11/15/19 1311          Pain Assessment    Additional Documentation  Pain Scale: FACES Pre/Post-Treatment (Group)  -EJ     Row Name 11/15/19 1311          Pain Scale: Numbers Pre/Post-Treatment    Pain Location - Side  Left  -EJ     Pain Location  ankle  -EJ     Pain Intervention(s)  Repositioned;Ambulation/increased activity  -[facility] Name 11/15/19 1311          Pain Scale: FACES Pre/Post-Treatment    Pain: FACES Scale, Pretreatment  0-->no hurt  -EJ     Pain: FACES Scale, Post-Treatment  2-->hurts little bit  -[facility] Name 11/15/19 1311          Plan of Care Review    Plan of Care Reviewed With  patient  -EJ     Row Name 11/15/19 1311          Physical Therapy Clinical Impression    Criteria for Skilled Interventions Met (PT Clinical Impression)  yes;treatment indicated  -EJ     Rehab Potential (PT Clinical Summary)  good, to achieve stated therapy goals  -     Row Name 11/15/19 1311          Positioning and Restraints    Pre-Treatment Position  in bed  -EJ     Post Treatment Position  bed  -EJ     In Bed  notified nsg;call light within reach;encouraged to call for  assist;supine;LLE elevated  -EJ       User Key  (r) = Recorded By, (t) = Taken By, (c) = Cosigned By    Initials Name Provider Type    Noreen Catherine PT Physical Therapist        Outcome Measures     Row Name 11/15/19 1314          How much help from another person do you currently need...    Turning from your back to your side while in flat bed without using bedrails?  4  -EJ     Moving from lying on back to sitting on the side of a flat bed without bedrails?  4  -EJ     Moving to and from a bed to a chair (including a wheelchair)?  4  -EJ     Standing up from a chair using your arms (e.g., wheelchair, bedside chair)?  4  -EJ     Climbing 3-5 steps with a railing?  3  -EJ     To walk in hospital room?  4  -EJ     AM-PAC 6 Clicks Score (PT)  23  -EJ     Row Name 11/15/19 1314          Functional Assessment    Outcome Measure Options  AM-PAC 6 Clicks Basic Mobility (PT)  -EJ       User Key  (r) = Recorded By, (t) = Taken By, (c) = Cosigned By    Initials Name Provider Type    Noreen Catherine PT Physical Therapist          PT Recommendation and Plan  Planned Therapy Interventions (PT Eval): balance training, bed mobility training, gait training, home exercise program, neuromuscular re-education, stair training, strengthening, stretching, postural re-education, transfer training  Outcome Summary/Treatment Plan (PT)  Anticipated Discharge Disposition (PT): home with assist  Plan of Care Reviewed With: patient  Progress: improving  Outcome Summary: Pt educated on NWB, use of and fitting of knee walker and crutches. Pt able to demonstrate adequate safety and understanding after PT demonstrations. PT issued crutches and pt has knee walker availible. PT expected to safely d/c home with assist and OPPT recommended when pt progressing from NWB. Recommended to have shower seat.     Time Calculation:   PT Charges     Row Name 11/15/19 5399             Time Calculation    Start Time  1105  -EJ      PT Received On   11/15/19  -EJ      PT Goal Re-Cert Due Date  11/25/19  -EJ         Time Calculation- PT    Total Timed Code Minutes- PT  20 minute(s)  -EJ         Timed Charges    27931 - PT Therapeutic Exercise Minutes  10  -EJ      93672 - Gait Training Minutes   10  -EJ        User Key  (r) = Recorded By, (t) = Taken By, (c) = Cosigned By    Initials Name Provider Type     Noreen Gee, PT Physical Therapist        Therapy Charges for Today     Code Description Service Date Service Provider Modifiers Qty    68606713428 HC PT THER PROC EA 15 MIN 11/15/2019 Noreen Gee, PT GP 1    91452808675 HC PT EVAL LOW COMPLEXITY 4 11/15/2019 Noreen Gee, PT GP 1          PT G-Codes  Outcome Measure Options: AM-PAC 6 Clicks Basic Mobility (PT)  AM-PAC 6 Clicks Score (PT): 23    PT Discharge Summary  Anticipated Discharge Disposition (PT): home with assist    Noreen Souza, PT  11/15/2019

## 2019-11-15 NOTE — PROGRESS NOTES
Orthopedic  Progress Note    Subjective     Post-Operative Day: 1 Day Post-Op-I&D left ankle    Systemic or Specific Complaints: no complaints, anxious to go home, does not want to take Lovenox, aspirin is ok  Objective     Vital signs in last 24 hours:  Temp:  [97.6 °F (36.4 °C)-98.4 °F (36.9 °C)] 98 °F (36.7 °C)  Heart Rate:  [55-98] 66  Resp:  [14-18] 14  BP: ()/(67-87) 116/69    Neurovascular: toes wiggle left foot               Wound: left splint dry    Data Review  Lab Results (last 24 hours)     Procedure Component Value Units Date/Time    Basic Metabolic Panel [[id number]]  (Abnormal) Collected:  11/15/19 0631    Specimen:  Blood Updated:  11/15/19 0803     Glucose 129 mg/dL      BUN 10 mg/dL      Creatinine 0.63 mg/dL      Sodium 137 mmol/L      Potassium 3.7 mmol/L      Chloride 106 mmol/L      CO2 23.0 mmol/L      Calcium 8.8 mg/dL      eGFR Non African Amer 101 mL/min/1.73      BUN/Creatinine Ratio 15.9     Anion Gap 8.0 mmol/L     Narrative:       GFR Normal >60  Chronic Kidney Disease <60  Kidney Failure <15    CBC (No Diff) [[id number]]  (Abnormal) Collected:  11/15/19 0631    Specimen:  Blood Updated:  11/15/19 0738     WBC 11.76 10*3/mm3      RBC 3.91 10*6/mm3      Hemoglobin 12.2 g/dL      Hematocrit 38.1 %      MCV 97.4 fL      MCH 31.2 pg      MCHC 32.0 g/dL      RDW 13.5 %      RDW-SD 48.3 fl      MPV 10.2 fL      Platelets 260 10*3/mm3     Tissue / Bone Culture - Tissue, Ankle, Left [[id number]] Collected:  11/14/19 1248    Specimen:  Tissue from Ankle, Left Updated:  11/14/19 1956     Gram Stain Rare (1+) WBCs seen      No organisms seen    CK [[id number]]  (Normal) Collected:  11/14/19 1606    Specimen:  Blood Updated:  11/14/19 1640     Creatine Kinase 42 U/L     Tissue Pathology Exam [[id number]] Collected:  11/14/19 1248    Specimen:  Tissue from Ankle, Left Updated:  11/14/19 5996    Anaerobic Culture - Tissue, Ankle, Left [[id number]] Collected:  11/14/19 1248    Specimen:  Tissue from  Ankle, Left Updated:  11/14/19 1328    Fungus Culture - Tissue, Ankle, Left [[id number]] Collected:  11/14/19 1248    Specimen:  Tissue from Ankle, Left Updated:  11/14/19 1328    AFB Culture - Tissue, Ankle, Left [[id number]] Collected:  11/14/19 1248    Specimen:  Tissue from Ankle, Left Updated:  11/14/19 1328    POC Urine Pregnancy Test [[id number]]  (Normal) Collected:  11/14/19 1124    Specimen:  Urine Updated:  11/14/19 1124     HCG, Urine, QL Negative     Lot Number [id number] 7-20     Internal Positive Control Positive     Internal Negative Control Negative            Assessment/Plan     Status post- I&D left ankle, ok to go home, Dr Herrera will arrange antibiotics, leave splint intact, see me wednesday           Jennifer Vidal MD    Date: 11/15/2019  Time: 8:43 AM

## 2019-11-15 NOTE — DISCHARGE SUMMARY
Patient Name: Ondina Loo  MRN: [id number]  : 1970  DOS: 11/15/2019    Attending: Jennifer Barillas MD    Primary Care Provider: Mariya Mercedes MD    Date of Admission:.2019 10:37 AM    Date of Discharge:  11/15/2019    Discharge Diagnosis:   Status post incision and drainage left medial ankle wound    Puncture wound of left ankle    Aftercare following surgery of the musculoskeletal system    Leukocytosis    Acute postop pain    Hospital Course  Patient is a 49 y.o. female presented for I&D left medial ankle wound by Dr. Barillas.     She underwent surgery under [facility]. She tolerated surgery well and was admitted for further medical management. She had sting ray injury on 19. She has had previous wound debridement 10/15/19 and has been on abx.      Per Dr. 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  We have irrigated debrided it and remove foreign body x1, she has been on antibiotics.  The wound has failed to completely heal.  There is a central eschar.  It has drainage and about 1 cm of erythema.  Because of the concern of possible recurrence of infection and development of osteomyelitis I recommended repeat irrigation and debridement.  At the time of surgery there is about 1 cm ring of erythema, there is some central eschar in the wound.  Deep there was some granulation tissue.  But there is no exposure of bone.  There is no identifiable foreign body.))     She is followed by Dr. Chance Herrera, [facility], for abx management. She complains of yeast infection unrelieved with OTC meds. She will be discharged on oral abx.    Patient was provided pain medications as needed for pain control.  Adjustments were made to pain medications to optimize postop pain management. Risks and benefits of opiate medications discussed with patient.    She was seen by PT has progressed well over her stay.  She used an IS for atelectasis  prophylaxis and Lovenox along with mechanicals for DVT prophylaxis.  Home medications were resumed as appropriate, and labs were monitored and remained fairly stable.     With the progress she has made, she is ready for DC home today.    Keep splint clean and dry until follow up appointment with Dr. Barillas.  Discussed with patient regarding plan and she shows understanding and agreement.          Procedures Performed  19 1:06 PM     Preoperative diagnosis: left ankle puncture wound, weeping wound, small abscess     Postoperative diagnosis: Same     Anesthesia: General with blocks for postop pain control     Surgeon: Jennifer Barillas M.D.     Operative procedure: I&D left medial ankle wound    Pertinent Test Results:    I reviewed the patient's new clinical results.   Results from last 7 days   Lab Units 11/15/19  0631   WBC 10*3/mm3 11.76*   HEMOGLOBIN g/dL 12.2   HEMATOCRIT % 38.1   PLATELETS 10*3/mm3 260     Results from last 7 days   Lab Units 11/15/19  0631   SODIUM mmol/L 137   POTASSIUM mmol/L 3.7   CHLORIDE mmol/L 106   CO2 mmol/L 23.0   BUN mg/dL 10   CREATININE mg/dL 0.63   CALCIUM mg/dL 8.8   GLUCOSE mg/dL 129*     I reviewed the patient's new imaging including images and reports.      Physical therapy: Pt educated on NWB, use of and fitting of knee walker and crutches. Pt able to demonstrate adequate safety and understanding after PT demonstrations. PT issued crutches and pt has knee walker availible. PT expected to safely d/c home with assist and OPPT recommended when pt progressing from NWB. Recommended to have shower seat.    Discharge Assessment:    Vital Signs  Visit Vitals  /69 (BP Location: Right arm, Patient Position: Lying)   Pulse 66   Temp 98 °F (36.7 °C) (Oral)   Resp 14   LMP 2019   SpO2 98%     Temp (24hrs), Av.8 °F (36.6 °C), Min:97.6 °F (36.4 °C), Max:98 °F (36.7 °C)      General Appearance:    Alert, cooperative, in no acute distress   Lungs:     Clear to  auscultation,respirations regular, even and                   unlabored    Heart:    Regular rhythm and normal rate, normal S1 and S2   Abdomen:     Normal bowel sounds, no masses, no organomegaly, soft        non-tender, non-distended, no guarding, no rebound                 tenderness   Extremities:  Left foot ACE wrap CDI. Able to wiggle toes, good cap refill.   Pulses:   Pulses palpable and equal bilaterally   Skin:   No bleeding, bruising or rash   Neurologic:   Cranial nerves 2 - 12 grossly intact, sensation intact       Discharge Disposition: Home    Discharge Medications     Discharge Medications      New Medications      Instructions Start Date   aspirin 81 MG chewable tablet   81 mg, Oral, Daily, For 1 month      docusate sodium 100 MG capsule  Commonly known as:  COLACE   100 mg, Oral, 2 Times Daily         Changes to Medications      Instructions Start Date   promethazine 12.5 MG tablet  Commonly known as:  PHENERGAN  What changed:  Another medication with the same name was removed. Continue taking this medication, and follow the directions you see here.   12.5 mg, Oral, Every 6 Hours PRN         Continue These Medications      Instructions Start Date   AMERGE 1 MG tablet  Generic drug:  naratriptan   1 mg, Oral, Once As Needed      doxycycline 100 MG capsule  Commonly known as:  MONODOX   100 mg, Oral, 2 Times Daily      HYDROcodone-acetaminophen 7.5-325 MG per tablet  Commonly known as:  NORCO   1-2 tablets, Oral, Every 6 Hours PRN      moxifloxacin 400 MG tablet  Commonly known as:  AVELOX   400 mg, Oral, Daily      oxyCODONE-acetaminophen 5-325 MG per tablet  Commonly known as:  PERCOCET   1-2 tablets, Oral, Every 6 Hours PRN      rizatriptan 10 MG tablet  Commonly known as:  MAXALT   10 mg, Oral, Once As Needed      SUMAtriptan 100 MG tablet  Commonly known as:  IMITREX   100 mg, Oral, Once As Needed      tiZANidine 2 MG tablet  Commonly known as:  ZANAFLEX   2 mg, Oral, Nightly      topiramate 100  MG tablet  Commonly known as:  TOPAMAX   200 mg, Oral, Nightly             Discharge Diet: Regular diet    Activity at Discharge: NWDESHAWN LLE    Follow-up Appointments  Dr. Barillas per her orders  Dr. Herrera per his orders      KARTHIK Apodaca  11/15/19  12:38 PM

## 2019-11-15 NOTE — PLAN OF CARE
Problem: Patient Care Overview  Goal: Plan of Care Review  Outcome: Ongoing (interventions implemented as appropriate)   11/15/19 1483   Coping/Psychosocial   Plan of Care Reviewed With patient   Plan of Care Review   Progress improving   OTHER   Outcome Summary Pt educated on NWB, use of and fitting of knee walker and crutches. Pt able to demonstrate adequate safety and understanding after PT demonstrations. PT issued crutches and pt has knee walker availible. PT expected to safely d/c home with assist and OPPT recommended when pt progressing from NWB. Recommended to have shower seat.

## 2019-11-15 NOTE — PROGRESS NOTES
Ondina Loo  1970  [id number]    Date of Consult: 11/15/2019    11/14/2019      Requesting Provider: Jennifer Vidal MD  Evaluating Physician: Chance Herrera MD    Chief Complaint: ankle pain    Reason for Consultation: ankle abscess    History of present illness (11/14/19):   Patient is a 49 y.o.  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 She had subsequently presented to my office with complaints of worsening swelling, redness, and pain around her ankle that developed a couple weeks after the initial injury. She was referred to Dr. Vidal and although no foreign body was seen on initial MRI, she was take to OR on 10/15 and underwent I&D left medial ankle bursa with removal of foreign body (sting ray edgar x2) and abscess. Cultures from this procedure have been no growth including a Mycobacterium marinum culture that was sent to . She did receive an initial 1 week course of Daptomycin and ceftriaxone and then after some improvement she was switched to bactrim/keflex. After her staple were removed, the area around her incision site started looking worse with erythema, some necrosis, slight purulent drainage, and increased pain. She had been ambulating with her left extremity a lot just prior to this. She was given a dose of Dalbavacin about 1 week ago (partially in effort to give her IV including MRSA coverage but in effort to avoid PICC placement). I additionally started her on empiric Avelox and doxycycline for some atypical coverage. She has since seen Dr. Vidal in office and due to failure to heal, eschar, and drainage and some concern for recurrence of infection, Dr Vidal recommended going back to OR for repeat I&D. She did hold her home abx for about 1 day prior to surgery.    She was take to OR today and at the time of surgery there was about 1cm ring of erythema with central eschar in the wound.  Deep to that there was granulation tissue but no exposed bone. There was no foreign body. I&D was performed and cultures were sent. The patient has remained afebrile. ID consulted for abx recs.    11/15/19:  She is doing ok today. No fevers overnight. Yeast infection better.  Asking for some more prn nausea meds for after her discharge. No diarrhea. Foot remains in splint. Cultures NGTD    Past Medical History:   Diagnosis Date   • Constipation     due to loop in bowel-HISTORY OF NO CURRENT ISSUES   • GERD (gastroesophageal reflux disease)    • Migraines    • Wears glasses        Past Surgical History:   Procedure Laterality Date   • BREAST AUGMENTATION     • BREAST CAPSULOTOMY, IMPLANT REVISION Bilateral 5/8/2018    Procedure: BILATERAL CAPSULECTOMY AND BREAST IMPLANT REMOVAL;  Surgeon: Eileen Lares MD;  Location:  [facility];  Service: Plastics   • COLONOSCOPY  2018   • INCISION AND DRAINAGE LEG Left 10/15/2019    Procedure: I&D LEFT ANKLE PUNCTURE WOUND;  Surgeon: Jennifer Vidal MD;  Location:  [facility];  Service: Orthopedics   • INCISION AND DRAINAGE LEG Left 11/14/2019    Procedure: INCISION AND DRAINAGE LEFT ANKLE;  Surgeon: Jennifer Vidal MD;  Location:  [facility];  Service: Orthopedics   • OOPHORECTOMY Left      Social History:  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 . No tobacco or illicit drug use. Occasional alcohol.      Family History:  negative    Allergies   Allergen Reactions   • Zofran [Ondansetron Hcl] Other (See Comments)     Severely constipated       Medication:  Current Facility-Administered Medications   Medication Dose Route Frequency Provider Last Rate Last Dose   • aspirin chewable tablet 81 mg  81 mg Oral Daily Jennifer Vidal MD       • bisacodyl (DULCOLAX) suppository 10 mg  10 mg Rectal Daily PRN Jennifer Vidal MD       • DAPTOmycin (CUBICIN) 350 mg in sodium chloride 0.9 % 50 mL IVPB  6 mg/kg (Adjusted) Intravenous Q24H Chance Herrera  mL/hr at 11/15/19 0828 350 mg at  11/15/19 0828   • dextrose 5 % and sodium chloride 0.45 % with KCl 20 mEq/L infusion  50 mL/hr Intravenous Continuous Jennifer Vidal MD 50 mL/hr at 11/14/19 1726 50 mL/hr at 11/14/19 1726   • diphenhydrAMINE (BENADRYL) capsule 25 mg  25 mg Oral Nightly PRN Jennifer Vidal MD        Or   • diphenhydrAMINE (BENADRYL) injection 25 mg  25 mg Intravenous Nightly PRN Jennifer Vidal MD       • fluconazole (DIFLUCAN) tablet 100 mg  100 mg Oral Q24H Chance Herrera MD       • HYDROcodone-acetaminophen (NORCO) 7.5-325 MG per tablet 1 tablet  1 tablet Oral Q4H PRN Jennifer Vidal MD       • HYDROcodone-acetaminophen (NORCO) 7.5-325 MG per tablet 2 tablet  2 tablet Oral Q4H PRN Jennifer Vidal MD   2 tablet at 11/15/19 0828   • HYDROmorphone (DILAUDID) injection 1 mg  1 mg Intravenous Q2H PRN Jennifer Vidal MD        And   • naloxone (NARCAN) injection 0.4 mg  0.4 mg Intravenous Q5 Min PRN Jennifer Vidal MD       • labetalol (NORMODYNE,TRANDATE) injection 10 mg  10 mg Intravenous Q4H PRN Mariya Ovalle APRN       • lactated ringers infusion  9 mL/hr Intravenous Continuous Cedric Daniel MD   Stopped at 11/14/19 1838   • magnesium hydroxide (MILK OF MAGNESIA) suspension 2400 mg/10mL 10 mL  10 mL Oral Daily PRN Jennifer Vidal MD       • multivitamin (THERAGRAN) tablet 1 tablet  1 tablet Oral Daily Jennifer Vidal MD   1 tablet at 11/15/19 0828   • oxyCODONE-acetaminophen (PERCOCET) 5-325 MG per tablet 1 tablet  1 tablet Oral Q4H PRN Jennifer Vidal MD       • oxyCODONE-acetaminophen (PERCOCET) 5-325 MG per tablet 2 tablet  2 tablet Oral Q4H PRN Jennifer Vidal MD   2 tablet at 11/15/19 0228   • promethazine (PHENERGAN) injection 12.5 mg  12.5 mg Intramuscular Q4H PRN Jennifer Vidal MD       • promethazine (PHENERGAN) injection 12.5 mg  12.5 mg Intravenous Q4H PRN Jennifer Vidal MD       • promethazine (PHENERGAN) tablet 12.5 mg  12.5 mg Oral Q6H PRN Mariya Ovalle APRN   12.5 mg at 11/15/19 0828    • sodium chloride 0.9 % bolus 500 mL  500 mL Intravenous TID PRN Vasquez, Mariya, APRN       • tiZANidine (ZANAFLEX) tablet 2 mg  2 mg Oral Nightly Ovalle, Mariya, APRN   2 mg at 11/14/19 2216   • topiramate (TOPAMAX) tablet 200 mg  200 mg Oral Nightly Ovalle, Mariya, APRN   200 mg at 11/14/19 2215         Infectious History:  No active infections    Antibiotics:  Anti-Infectives (From admission, onward)    Ordered     Dose/Rate Route Frequency Start Stop    11/15/19 0637  fluconazole (DIFLUCAN) tablet 100 mg     Ordering Provider:  Chance Herrera MD    100 mg Oral Every 24 Hours 11/15/19 1200 11/17/19 1159    11/14/19 2016  fluconazole (DIFLUCAN) tablet 100 mg     Ordering Provider:  Ailyn Lim APRN    100 mg Oral Once 11/14/19 2115 11/14/19 2026    11/14/19 1544  DAPTOmycin (CUBICIN) 350 mg in sodium chloride 0.9 % 50 mL IVPB     Ordering Provider:  Chance Herrera MD    6 mg/kg × 60.8 kg (Adjusted)  100 mL/hr over 30 Minutes Intravenous Every 24 Hours Scheduled 11/14/19 1630 11/21/19 0859            Review of Systems  As above    Physical Exam:   Vital Signs   /69 (BP Location: Right arm, Patient Position: Lying)   Pulse 66   Temp 98 °F (36.7 °C) (Oral)   Resp 14   LMP 08/14/2019   SpO2 98%     GENERAL: Awake and alert, in no acute distress. Lying comfortably in bed  HEENT: Normocephalic, atraumatic.  No conjunctival injection.   NECK: Supple without nuchal rigidity. No mass.  HEART: RRR; No murmur, rubs, gallops.   LUNGS: Clear to auscultation bilaterally without wheezing, rales, rhonchi. Normal respiratory effort. Non-labored breathing on room air  EXT:  Left foot with surgical dressings and splint in place. No edema noted over right leg.  MSK: no new joint effusions noted. Left ankle with surgical dressings as above.  SKIN: Warm and dry without cutaneous eruptions on Inspection/palpation.  No embolic phenomena noted.  NEURO: Oriented to PPT. No focal deficits on  motor/sensory exam at arms/legs.  PSYCHIATRIC: Normal insight and judgement. Cooperative with PE    Laboratory Data:  CK level 42    Estimated Creatinine Clearance: 106 mL/min (by C-G formula based on SCr of 0.63 mg/dL).       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 I do think that some of her issues with healing have been related to her ambulating too much immediately after her sutures were recently removed. Her wound has been slow to heal and has recently had some erythema and necrosis and some mild drainage. Now s/p another I&D procedure and I have discussed operative findings with Dr. Vidal today and mostly granulation tissue noted and did not extend down to bone. Actually looked better than expected intra-operatively and no new foreign body found. I had initially started the patient on broad spectrum coverage with Daptomycin and Ertapenem given some concern for progressive infection on her other antibiotic regimen but I do think that we can de-escalate her abx at the time of discharge back to an oral antibiotic regimen.     Problems:  Left ankle wound infection/abscess  Recent sting ray injury to left ankle with foreign body removal (sting ray edgar) on 10/15/19  Vaginal candidiasis- improved s/p fluconazole    PLAN: Thank you for asking us to see Ondina Loo, I recommend the followin. S/p perioperative IV abx    I am ok with her discharge today. I am having our office call in her the following meds:    UM/JOSELIN:  1. Moxifloxacin 400 mg PO Daily x1 week  2. Doxycycline 100 mg PO BID x 14 day supply  3. Phenergan 12.5 mg PO q6h prn nausea    I will see her back in clinic in 2 weeks unless she is doing worse in the interim or if her cultures turn  positive. I will have our office staff assist with tracking her cultures.    I discussed in length with the patient today. I discussed with her  again today      Chance Herrera MD  11/15/2019

## 2019-11-15 NOTE — PROGRESS NOTES
Discharge Planning Assessment  [facility]     Patient Name: Ondina Loo  MRN: [id number]  Today's Date: 11/15/2019    Admit Date: 11/14/2019    Discharge Needs Assessment     Row Name 11/15/19 1152       Living Environment    Lives With  spouse    Current Living Arrangements  home/apartment/condo    Primary Care Provided by  self    Provides Primary Care For  no one    Family Caregiver if Needed  spouse;child(suraj), adult    Able to Return to Prior Arrangements  yes       Transition Planning    Patient/Family Anticipates Transition to  home    Transportation Anticipated  family or friend will provide       Discharge Needs Assessment    Equipment Currently Used at Home  shower chair;other (see comments) knee walker        Discharge Plan     Row Name 11/15/19 1153       Plan    Plan  Home     Patient/Family in Agreement with Plan  yes    Plan Comments  Spoke w/ patient and  at bedside. They live in a one story in [facility] she was independent with ADL's and mobility. She has a knee walker at home for use if needed. Therapy eval pending. ID following, abx regimen per their recs. Plan is home w/ assist from . CM following.     Final Discharge Disposition Code  01 - home or self-care        Destination      No service coordination in this encounter.      Durable Medical Equipment      No service coordination in this encounter.      Dialysis/Infusion      No service coordination in this encounter.      Home Medical Care      No service coordination in this encounter.      Therapy      No service coordination in this encounter.      Community Resources      No service coordination in this encounter.          Demographic Summary     Row Name 11/15/19 1149       General Information    Arrived From  home    Reason for Consult  discharge planning        Functional Status     Row Name 11/15/19 1152       Functional Status    Usual Activity Tolerance  excellent    Current Activity Tolerance  good         Psychosocial    No documentation.       Abuse/Neglect    No documentation.       Legal    No documentation.       Substance Abuse    No documentation.       Patient Forms    No documentation.           Joya Stiles RN

## 2019-11-15 NOTE — CONSULTS
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 She had subsequently presented to my office with complaints of worsening swelling, redness, and pain around her ankle that developed a couple weeks after the initial injury. She was referred to Dr. Vidal and although no foreign body was seen on initial MRI, she was take to OR on 10/15 and underwent I&D left medial ankle bursa with removal of foreign body (sting ray edgar x2) and abscess. Cultures from this procedure have been no growth including a Mycobacterium marinum culture that was sent to . She did receive an initial 1 week course of Daptomycin and ceftriaxone and then after some improvement she was switched to bactrim/keflex. After her staple were removed, the area around her incision site started looking worse with erythema, some necrosis, slight purulent drainage, and increased pain. She had been ambulating with her left extremity a lot just prior to this. She was given a dose of Dalbavacin about 1 week ago (partially in effort to give her IV including MRSA coverage but in effort to avoid PICC placement). I additionally started her on empiric Avelox and doxycycline for some atypical coverage. She has since seen Dr. Vidal in office and due to failure to heal, eschar, and drainage and some concern for recurrence of infection, Dr Vidal recommended going back to OR for repeat I&D. She did hold her home abx for about 1 day prior to surgery.    She was take to OR today and at the time of surgery there was about 1cm ring of erythema with central eschar in the wound. Deep to that  there was granulation tissue but no exposed bone. There was no foreign body. I&D was performed and cultures were sent. The patient has remained afebrile. ID consulted for abx recs.    Past Medical History:   Diagnosis Date   • Constipation     due to loop in bowel-HISTORY OF NO CURRENT ISSUES   • GERD (gastroesophageal reflux disease)    • Migraines    • Wears glasses        Past Surgical History:   Procedure Laterality Date   • BREAST AUGMENTATION     • BREAST CAPSULOTOMY, IMPLANT REVISION Bilateral 5/8/2018    Procedure: BILATERAL CAPSULECTOMY AND BREAST IMPLANT REMOVAL;  Surgeon: Eileen Lares MD;  Location: [facility] OR;  Service: Plastics   • COLONOSCOPY  2018   • INCISION AND DRAINAGE LEG Left 10/15/2019    Procedure: I&D LEFT ANKLE PUNCTURE WOUND;  Surgeon: Jennifer Vidal MD;  Location: [facility] OR;  Service: Orthopedics   • OOPHORECTOMY Left      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      Family History:  negative    Allergies   Allergen Reactions   • Zofran [Ondansetron Hcl] Other (See Comments)     Severely constipated       Medication:  Current Facility-Administered Medications   Medication Dose Route Frequency Provider Last Rate Last Dose   • bisacodyl (DULCOLAX) suppository 10 mg  10 mg Rectal Daily PRN Jennifer Vidal MD       • DAPTOmycin (CUBICIN) 350 mg in sodium chloride 0.9 % 50 mL IVPB  6 mg/kg (Adjusted) Intravenous Q24H Chance Herrera MD       • dextrose 5 % and sodium chloride 0.45 % with KCl 20 mEq/L infusion  50 mL/hr Intravenous Continuous Jennifer Vidal MD 50 mL/hr at 11/14/19 1726 50 mL/hr at 11/14/19 1726   • diphenhydrAMINE (BENADRYL) capsule 25 mg  25 mg Oral Nightly PRN Jennifer Vidal MD        Or   • diphenhydrAMINE (BENADRYL) injection 25 mg  25 mg Intravenous Nightly PRN Jennifer Vidal MD       • enoxaparin (LOVENOX) syringe 40 mg  40 mg Subcutaneous Daily Jennifer Vidal MD       • ertapenem (INVanz) 1  g/100 mL 0.9% NS VTB (mbp)  1 g Intravenous Q24H Chance Herrera MD   1 g at 11/14/19 1725   • HYDROcodone-acetaminophen (NORCO) 7.5-325 MG per tablet 1 tablet  1 tablet Oral Q4H PRN Jennifer Vidal MD       • HYDROcodone-acetaminophen (NORCO) 7.5-325 MG per tablet 2 tablet  2 tablet Oral Q4H PRN Jennifer Vidal MD       • HYDROmorphone (DILAUDID) injection 1 mg  1 mg Intravenous Q2H PRN Jennifer Vidal MD        And   • naloxone (NARCAN) injection 0.4 mg  0.4 mg Intravenous Q5 Min PRN Jennifer Vidal MD       • labetalol (NORMODYNE,TRANDATE) injection 10 mg  10 mg Intravenous Q4H PRN Mariya Ovalle APRN       • lactated ringers infusion  9 mL/hr Intravenous Continuous Cedric Daniel MD   Stopped at 11/14/19 1838   • magnesium hydroxide (MILK OF MAGNESIA) suspension 2400 mg/10mL 10 mL  10 mL Oral Daily PRN Jennifer Vidal MD       • multivitamin (THERAGRAN) tablet 1 tablet  1 tablet Oral Daily Jennifer Vidal MD       • oxyCODONE-acetaminophen (PERCOCET) 5-325 MG per tablet 1 tablet  1 tablet Oral Q4H PRN Jennifer Vidal MD       • oxyCODONE-acetaminophen (PERCOCET) 5-325 MG per tablet 2 tablet  2 tablet Oral Q4H PRN Jennifer Vidal MD   2 tablet at 11/15/19 0228   • promethazine (PHENERGAN) injection 12.5 mg  12.5 mg Intramuscular Q4H PRN Jennifer Vidal MD       • promethazine (PHENERGAN) injection 12.5 mg  12.5 mg Intravenous Q4H PRN Jennifer Vidal MD       • promethazine (PHENERGAN) tablet 12.5 mg  12.5 mg Oral Q6H PRN Mariya Ovalle APRN   12.5 mg at 11/15/19 0229   • sodium chloride 0.9 % bolus 500 mL  500 mL Intravenous TID PRN Mariya Ovalle APRN       • tiZANidine (ZANAFLEX) tablet 2 mg  2 mg Oral Nightly Mariya Ovalle APRN   2 mg at 11/14/19 2216   • topiramate (TOPAMAX) tablet 200 mg  200 mg Oral Nightly Mariya Ovalle APRN   200 mg at 11/14/19 2215         Infectious History:  No active infections    Antibiotics:  Anti-Infectives (From admission, onward)    Ordered      Dose/Rate Route Frequency Start Stop    11/14/19 2016  fluconazole (DIFLUCAN) tablet 100 mg     Ordering Provider:  Ailyn Lim APRN    100 mg Oral Once 11/14/19 2115 11/14/19 2026 11/14/19 1608  ertapenem (INVanz) 1 g/100 mL 0.9% NS VTB (mbp)     Ordering Provider:  Chance Herrera MD    1 g  over 30 Minutes Intravenous Every 24 Hours Scheduled 11/14/19 1700 11/21/19 0859    11/14/19 1544  DAPTOmycin (CUBICIN) 350 mg in sodium chloride 0.9 % 50 mL IVPB     Ordering Provider:  Chance Herrera MD    6 mg/kg × 60.8 kg (Adjusted)  100 mL/hr over 30 Minutes Intravenous Every 24 Hours Scheduled 11/14/19 1630 11/21/19 0859            Review of Systems    Constitutional-- No Fever, chills or sweats.  Appetite good, and no malaise. No fatigue.  Heent-- No new vision, hearing or throat complaints.    CV-- No chest pain, palpitation or syncope  Resp-- No SOB/cough/Hemoptysis  GI- + nausea with doxycycline recently, vomiting, or diarrhea.  No hematochezia, melena, or hematemesis. Denies jaundice or chronic liver disease.  : +vaginal yeast infection  Heme- No active bruising or bleeding; no Hx of DVT or PE.  MS-- other than some left ankle swelling, no swelling or pain in the bones or joints of arms/legs.    Neuro-- No acute focal weakness or numbness in the arms or legs.  No seizures.  Skin-- No new rashes, lesions, ulcers. No skin breakdown    12 systems were reviewed and were negative otherwise for acute complaints, except for as above and per HPI    Physical Exam:   Vital Signs   /71 (BP Location: Right arm, Patient Position: Lying)   Pulse 76   Temp 97.9 °F (36.6 °C) (Oral)   Resp 14   LMP 08/14/2019   SpO2 98%     GENERAL: Awake and alert, in no acute distress. Lying comfortably in bed  HEENT: Normocephalic, atraumatic.  No conjunctival injection.   NECK: Supple without nuchal rigidity. No mass.  HEART: RRR; No murmur, rubs, gallops.   LUNGS: Clear to auscultation bilaterally without  wheezing, rales, rhonchi. Normal respiratory effort. Non-labored breathing on room air  EXT:  Left foot with surgical dressings and splint in place. No edema noted over right leg.  MSK: no new joint effusions noted. Left ankle with surgical dressings as above.  SKIN: Warm and dry without cutaneous eruptions on Inspection/palpation.  No embolic phenomena noted.  NEURO: Oriented to PPT. No focal deficits on motor/sensory exam at arms/legs.  PSYCHIATRIC: Normal insight and judgement. Cooperative with PE    Laboratory Data:  CK level 42    Estimated Creatinine Clearance: 91.4 mL/min (by C-G formula based on SCr of 0.73 mg/dL).       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 I do think that some of her issues with healing have been related to her ambulating too much immediately after her sutures were recently removed. Her wound has been slow to heal and has recently had some erythema and necrosis and some mild drainage. Now s/p another I&D procedure and I have discussed operative findings with Dr. Vidal today and mostly granulation tissue noted and did not extend down to bone. Actually looked better than expected intra-operatively and no new foreign body found. I had initially started the patient on broad spectrum coverage with Daptomycin and Ertapenem given some concern for progressive infection on her other antibiotic regimen but I do think that we can de-escalate her abx at the time of discharge back to an oral antibiotic regimen. She does c/o vaginal yeast infection due to abx.    Problems:  Left ankle wound infection/abscess  Recent sting ray injury to left ankle with foreign body removal (sting ray edgar) on 10/15/19  Vaginal candidiasis    PLAN: Thank you for asking  us to see Ondina Loumegan Loo, I recommend the followin. Can given some fluconazole 100 mg PO Daily for vaginal candidiasis  2. S/p Daptomycin and Ertapenem. continued on Daptomycin while inpatient  3. Can probably switch back to oral antibiotics at the time of discharge. She was having some nausea with doxycycline/moxifloxacin combination and I will need to discuss with her further to come up with a final regimen.  4. Follow up operative cultures  5.  elevated, splint, non-wt bearing per Dr. Vidal    I discussed in length with the patient today. I discussed with her   I discussed with Dr. Vidal.    Chance Herrera MD  11/15/2019

## 2019-11-17 LAB
BACTERIA SPEC AEROBE CULT: NORMAL
GRAM STN SPEC: NORMAL
GRAM STN SPEC: NORMAL

## 2019-11-18 LAB
CYTO UR: NORMAL
LAB AP CASE REPORT: NORMAL
LAB AP CLINICAL INFORMATION: NORMAL
PATH REPORT.FINAL DX SPEC: NORMAL
PATH REPORT.GROSS SPEC: NORMAL

## 2019-11-19 LAB — BACTERIA SPEC ANAEROBE CULT: NORMAL

## 2019-11-20 ENCOUNTER — OFFICE VISIT (OUTPATIENT)
Dept: ORTHOPEDIC SURGERY | Facility: CLINIC | Age: 49
End: 2019-11-20

## 2019-11-20 DIAGNOSIS — S91.032A PUNCTURE WOUND OF LEFT ANKLE, INITIAL ENCOUNTER: Primary | ICD-10-CM

## 2019-11-20 PROCEDURE — 99024 POSTOP FOLLOW-UP VISIT: CPT | Performed by: ORTHOPAEDIC SURGERY

## 2019-11-20 PROCEDURE — 29515 APPLICATION SHORT LEG SPLINT: CPT | Performed by: ORTHOPAEDIC SURGERY

## 2019-11-20 NOTE — PROGRESS NOTES
Post-op (1 week status post I&D left ankle 11/14/19)      Ondina Loo is 1 week status post repeat I&D left ankle. She reports no fever, chills.  She reports pain is well controlled.  They have been taking aspirin for DVT prophylaxis.  They have been NWB in splint.      Past Surgical History:   Procedure Laterality Date   • BREAST AUGMENTATION     • BREAST CAPSULOTOMY, IMPLANT REVISION Bilateral 5/8/2018    Procedure: BILATERAL CAPSULECTOMY AND BREAST IMPLANT REMOVAL;  Surgeon: Eileen Lares MD;  Location: [facility] OR;  Service: Plastics   • COLONOSCOPY  2018   • INCISION AND DRAINAGE LEG Left 10/15/2019    Procedure: I&D LEFT ANKLE PUNCTURE WOUND;  Surgeon: Jennifer Vidal MD;  Location:  [facility] OR;  Service: Orthopedics   • INCISION AND DRAINAGE LEG Left 11/14/2019    Procedure: INCISION AND DRAINAGE LEFT ANKLE;  Surgeon: Jennifer Vidal MD;  Location: [facility] OR;  Service: Orthopedics   • OOPHORECTOMY Left        There were no vitals taken for this visit.        Good alignment, no erythema, no drainage, no sign of infection, normal post op swelling left ankle    reviewed prior lab results- cultures neg so far, path report shows necrosis    Assessment and Plan:   1. Puncture wound of left ankle, initial encounter  Improving after 2nd I&D, we need to keep this immobilized, non-wt bearing.  She was re-dressed and placed in a posterior short leg fiberglass splint, I will see her in 10 days, sooner if any problems

## 2019-11-26 LAB — FUNGUS WND CULT: NORMAL

## 2019-12-02 ENCOUNTER — OFFICE VISIT (OUTPATIENT)
Dept: ORTHOPEDIC SURGERY | Facility: CLINIC | Age: 49
End: 2019-12-02

## 2019-12-02 DIAGNOSIS — S91.032A PUNCTURE WOUND OF LEFT ANKLE, INITIAL ENCOUNTER: Primary | ICD-10-CM

## 2019-12-02 PROCEDURE — 99024 POSTOP FOLLOW-UP VISIT: CPT | Performed by: ORTHOPAEDIC SURGERY

## 2019-12-02 NOTE — PROGRESS NOTES
Post-op Follow-up (2 week f/u; 3 weeks status post I&D left ankle 11/14/19)      Ondina Loo is 3 weeks status post repeat I&D left ankle, 11/14/19. She reports no fever, chills.  She reports pain is improving .  They have been taking aspirin for DVT prophylaxis.  They have been NWB in splint.      Past Surgical History:   Procedure Laterality Date   • BREAST AUGMENTATION     • BREAST CAPSULOTOMY, IMPLANT REVISION Bilateral 5/8/2018    Procedure: BILATERAL CAPSULECTOMY AND BREAST IMPLANT REMOVAL;  Surgeon: Eileen Lares MD;  Location:  [facility];  Service: Plastics   • COLONOSCOPY  2018   • INCISION AND DRAINAGE LEG Left 10/15/2019    Procedure: I&D LEFT ANKLE PUNCTURE WOUND;  Surgeon: Jennifer Vidal MD;  Location:  [facility];  Service: Orthopedics   • INCISION AND DRAINAGE LEG Left 11/14/2019    Procedure: INCISION AND DRAINAGE LEFT ANKLE;  Surgeon: Jennifer Vidal MD;  Location: [facility] OR;  Service: Orthopedics   • OOPHORECTOMY Left        There were no vitals taken for this visit.       no erythema, no drainage, no sign of infection, normal post op swelling left ankle    none    Assessment and Plan:   1. Puncture wound of left ankle, initial encounter  Definitely improved.  She reports much less pain than previously.  No signs of infection today.  We removed sutures.  Continue limited activity she may wear a low-cut shoe I will see her again in a week to possibly allow her to return to work

## 2019-12-09 ENCOUNTER — OFFICE VISIT (OUTPATIENT)
Dept: ORTHOPEDIC SURGERY | Facility: CLINIC | Age: 49
End: 2019-12-09

## 2019-12-09 DIAGNOSIS — S91.032A PUNCTURE WOUND OF LEFT ANKLE, INITIAL ENCOUNTER: Primary | ICD-10-CM

## 2019-12-09 PROCEDURE — 99024 POSTOP FOLLOW-UP VISIT: CPT | Performed by: ORTHOPAEDIC SURGERY

## 2019-12-09 NOTE — PROGRESS NOTES
Follow-up (1 week f/u; 4 weeks status post I&D left ankle 11/14/19)      Ondina Loo is 4 weeks status post repeat I&D left ankle puncture, 11/14/19. She reports no fever, chills.  She reports pain is well controlled.  They have been taking off meds now for DVT prophylaxis.  They have been weight bearing as tolerated.      Past Surgical History:   Procedure Laterality Date   • BREAST AUGMENTATION     • BREAST CAPSULOTOMY, IMPLANT REVISION Bilateral 5/8/2018    Procedure: BILATERAL CAPSULECTOMY AND BREAST IMPLANT REMOVAL;  Surgeon: Eileen Lares MD;  Location:  [facility];  Service: Plastics   • COLONOSCOPY  2018   • INCISION AND DRAINAGE LEG Left 10/15/2019    Procedure: I&D LEFT ANKLE PUNCTURE WOUND;  Surgeon: Jennifer Vidal MD;  Location:  [facility];  Service: Orthopedics   • INCISION AND DRAINAGE LEG Left 11/14/2019    Procedure: INCISION AND DRAINAGE LEFT ANKLE;  Surgeon: Jennifer Vidal MD;  Location: [facility] OR;  Service: Orthopedics   • OOPHORECTOMY Left        There were no vitals taken for this visit.        Good alignment, no erythema, no drainage, no sign of infection, normal post op swelling left medial ankle    none    Assessment and Plan:   1. Puncture wound of left ankle, initial encounter  Improving steadily, keep dressing on incision, may go to work, see me in a week

## 2019-12-11 LAB
MYCOBACTERIUM SPEC CULT: NORMAL
MYCOBACTERIUM SPEC CULT: NORMAL
NIGHT BLUE STAIN TISS: NORMAL

## 2019-12-16 ENCOUNTER — OFFICE VISIT (OUTPATIENT)
Dept: ORTHOPEDIC SURGERY | Facility: CLINIC | Age: 49
End: 2019-12-16

## 2019-12-16 DIAGNOSIS — S91.032A PUNCTURE WOUND OF LEFT ANKLE, INITIAL ENCOUNTER: Primary | ICD-10-CM

## 2019-12-16 PROCEDURE — 99024 POSTOP FOLLOW-UP VISIT: CPT | Performed by: ORTHOPAEDIC SURGERY

## 2019-12-16 NOTE — PROGRESS NOTES
Post-op (1 week f/u; 4.5 weeks status post I&D left ankle 11/14/19)      Ondina Loo is 4 weeks status post repeat I&D left ankle puncture wound, 11/14/19. She reports no fever, chills.  She reports pain is resolved.  They have been taking off meds now for DVT prophylaxis.  They have been weight bearing as tolerated.      Past Surgical History:   Procedure Laterality Date   • BREAST AUGMENTATION     • BREAST CAPSULOTOMY, IMPLANT REVISION Bilateral 5/8/2018    Procedure: BILATERAL CAPSULECTOMY AND BREAST IMPLANT REMOVAL;  Surgeon: Eileen Lares MD;  Location:  [facility];  Service: Plastics   • COLONOSCOPY  2018   • INCISION AND DRAINAGE LEG Left 10/15/2019    Procedure: I&D LEFT ANKLE PUNCTURE WOUND;  Surgeon: Jennifer Vidal MD;  Location:  [facility];  Service: Orthopedics   • INCISION AND DRAINAGE LEG Left 11/14/2019    Procedure: INCISION AND DRAINAGE LEFT ANKLE;  Surgeon: Jennifer Vidal MD;  Location:  [facility];  Service: Orthopedics   • OOPHORECTOMY Left        There were no vitals taken for this visit.       , no erythema, no drainage, no sign of infection, normal post op swelling left ankle incision healing well    reviewed prior lab results- all cultures neg    Assessment and Plan:   1. Puncture wound of left ankle, initial encounter        Doing well, normal swelling, continue activities as tolerated, compression sock, see me in 3-4 weeks for ankle xray

## 2019-12-26 LAB
FUNGUS WND CULT: NORMAL
MYCOBACTERIUM SPEC CULT: NORMAL
NIGHT BLUE STAIN TISS: NORMAL

## 2020-01-08 ENCOUNTER — OFFICE VISIT (OUTPATIENT)
Dept: ORTHOPEDIC SURGERY | Facility: CLINIC | Age: 50
End: 2020-01-08

## 2020-01-08 DIAGNOSIS — M25.472 LEFT ANKLE EFFUSION: Primary | ICD-10-CM

## 2020-01-08 DIAGNOSIS — Z98.890 HISTORY OF ANKLE SURGERY: ICD-10-CM

## 2020-01-08 DIAGNOSIS — S91.032A PUNCTURE WOUND OF LEFT ANKLE, INITIAL ENCOUNTER: ICD-10-CM

## 2020-01-08 PROCEDURE — 99024 POSTOP FOLLOW-UP VISIT: CPT | Performed by: ORTHOPAEDIC SURGERY

## 2020-01-08 NOTE — PROGRESS NOTES
Post-op Follow-up (3 week f/u, 8 week status post I&D left ankle 11/14/19)      Ondina Loo is 7 weeks status post repeat I&D left ankle stingray injury, infection, 11/14/19. She reports no fever, chills.  She reports pain is resolved.  They have been taking off meds now for DVT prophylaxis.  They have been weight bearing as tolerated.      Past Surgical History:   Procedure Laterality Date   • BREAST AUGMENTATION     • BREAST CAPSULOTOMY, IMPLANT REVISION Bilateral 5/8/2018    Procedure: BILATERAL CAPSULECTOMY AND BREAST IMPLANT REMOVAL;  Surgeon: Eileen Lares MD;  [facility];  Service: Plastics   • COLONOSCOPY  2018   • INCISION AND DRAINAGE LEG Left 10/15/2019    Procedure: I&D LEFT ANKLE PUNCTURE WOUND;  Surgeon: Jennifer Vidal MD;  [facility];  Service: Orthopedics   • INCISION AND DRAINAGE LEG Left 11/14/2019    Procedure: INCISION AND DRAINAGE LEFT ANKLE;  Surgeon: Jennifer Vidal MD;  [facility];  Service: Orthopedics   • OOPHORECTOMY Left        There were no vitals taken for this visit.       , no erythema, no drainage, no sign of infection, normal post op swelling left ankle incision is healed    ordered and reviewed x-rays today    Assessment and Plan:   1.Puncture wound of left ankle, initial encounter  Infection is resolved, no abnormality on xray, I will be happy to see her any time

## 2020-02-03 ENCOUNTER — TRANSCRIBE ORDERS (OUTPATIENT)
Dept: ADMINISTRATIVE | Facility: HOSPITAL | Age: 50
End: 2020-02-03

## 2020-02-03 DIAGNOSIS — Z12.31 VISIT FOR SCREENING MAMMOGRAM: Primary | ICD-10-CM

## 2020-04-01 ENCOUNTER — APPOINTMENT (OUTPATIENT)
Dept: MAMMOGRAPHY | Facility: HOSPITAL | Age: 50
End: 2020-04-01

## 2020-12-18 ENCOUNTER — IMMUNIZATION (OUTPATIENT)
Dept: VACCINE CLINIC | Facility: HOSPITAL | Age: 50
End: 2020-12-18

## 2020-12-18 PROCEDURE — 91300 HC SARSCOV02 VAC 30MCG/0.3ML IM: CPT | Performed by: PHYSICIAN ASSISTANT

## 2020-12-18 PROCEDURE — 0001A: CPT | Performed by: PHYSICIAN ASSISTANT

## 2021-01-08 ENCOUNTER — IMMUNIZATION (OUTPATIENT)
Dept: VACCINE CLINIC | Facility: HOSPITAL | Age: 51
End: 2021-01-08

## 2021-01-08 PROCEDURE — 91300 HC SARSCOV02 VAC 30MCG/0.3ML IM: CPT | Performed by: PHYSICIAN ASSISTANT

## 2021-01-08 PROCEDURE — 0001A: CPT | Performed by: PHYSICIAN ASSISTANT

## 2021-01-08 PROCEDURE — 0002A: CPT | Performed by: PHYSICIAN ASSISTANT

## 2021-02-09 ENCOUNTER — OFFICE VISIT (OUTPATIENT)
Dept: OBSTETRICS AND GYNECOLOGY | Facility: CLINIC | Age: 51
End: 2021-02-09

## 2021-02-09 VITALS
HEIGHT: 65 IN | DIASTOLIC BLOOD PRESSURE: 76 MMHG | BODY MASS INDEX: 28.66 KG/M2 | SYSTOLIC BLOOD PRESSURE: 118 MMHG | WEIGHT: 172 LBS

## 2021-02-09 DIAGNOSIS — Z12.39 ENCOUNTER FOR BREAST CANCER SCREENING USING NON-MAMMOGRAM MODALITY: ICD-10-CM

## 2021-02-09 DIAGNOSIS — R14.0 BLOATING: ICD-10-CM

## 2021-02-09 DIAGNOSIS — G47.00 INSOMNIA, UNSPECIFIED TYPE: ICD-10-CM

## 2021-02-09 DIAGNOSIS — N95.1 MENOPAUSAL SYMPTOMS: ICD-10-CM

## 2021-02-09 DIAGNOSIS — Z01.419 WOMEN'S ANNUAL ROUTINE GYNECOLOGICAL EXAMINATION: Primary | ICD-10-CM

## 2021-02-09 PROCEDURE — 99396 PREV VISIT EST AGE 40-64: CPT | Performed by: OBSTETRICS & GYNECOLOGY

## 2021-02-09 RX ORDER — ESTRADIOL 0.1 MG/D
1 PATCH, EXTENDED RELEASE TRANSDERMAL 2 TIMES WEEKLY
Qty: 8 PATCH | Refills: 12 | Status: SHIPPED | OUTPATIENT
Start: 2021-02-11 | End: 2021-03-11

## 2021-02-09 RX ORDER — ESZOPICLONE 3 MG/1
3 TABLET, FILM COATED ORAL NIGHTLY
Qty: 30 TABLET | Refills: 0 | Status: SHIPPED | OUTPATIENT
Start: 2021-02-09 | End: 2022-02-22

## 2021-02-09 RX ORDER — CALCIUM POLYCARBOPHIL 625 MG
TABLET ORAL DAILY
COMMUNITY
End: 2021-03-11

## 2021-02-09 NOTE — PROGRESS NOTES
GYN Annual Exam     CC - Here for annual exam. She has not had a hysterectomy and does have one or both ovaries.    Subjective   HPI  Ondina Loo is a 50 y.o. female, , who presents for annual well woman exam.  She is perimenopausal.  Patient's last menstrual period was 10/01/2019. Patient reports problems with: hot flashes, night sweats, mood swings, bloating, insomnia, pelvic pressure.  Partner Status: Marital Status: .  New Partners since last visit: no Desires STD Screening: no    Last mammogram:  . Cancelled in  due to Covid. Needs rescheduled  Last Completed Mammogram     Patient has no health maintenance due at this time          Last colonoscopy :    Last Completed Mammogram     Patient has no health maintenance due at this time          BDS: was not indicated      Additional OB/GYN History     Current contraception: contraceptive methods: Tubal ligation  Desires to: continue contraception  Last Pap :   Last Completed Pap Smear       Status Date      PAP SMEAR Done 2020 neg, neg HPV        Past Surgical History:   Procedure Laterality Date   • BREAST AUGMENTATION     • BREAST CAPSULOTOMY, IMPLANT REVISION Bilateral 2018    Procedure: BILATERAL CAPSULECTOMY AND BREAST IMPLANT REMOVAL;  Surgeon: Eileen Lares MD;  Location:  [facility];  Service: Plastics   • COLONOSCOPY  2017   • INCISION AND DRAINAGE LEG Left 10/15/2019    Procedure: I&D LEFT ANKLE PUNCTURE WOUND;  Surgeon: Jennifer Vidal MD;  Location:  [facility];  Service: Orthopedics   • INCISION AND DRAINAGE LEG Left 2019    Procedure: INCISION AND DRAINAGE LEFT ANKLE;  Surgeon: Jennifer Vidal MD;  Location:  [facility];  Service: Orthopedics   • OOPHORECTOMY Left      History of abnormal Pap smear: no  Family history of uterine, colon, breast, or ovarian cancer: yes - Breast CA  Performs monthly Self-Breast Exam: yes - occ  Parental Hip Fracture: no  Exercises Regularly: no  Feelings of Anxiety or  "Depression: yes - mood swings    Tobacco Usage?: No   OB History        2    Para   1    Term   1            AB   1    Living   1       SAB   1    TAB        Ectopic        Molar        Multiple        Live Births   1                Health Maintenance   Topic Date Due   • Annual Gynecologic Pelvic and Breast Exam  1972   • ANNUAL PHYSICAL  1975   • TDAP/TD VACCINES (1 - Tdap) 1991   • HEPATITIS C SCREENING  2018   • INFLUENZA VACCINE  2020   • PAP SMEAR  2023   • COLONOSCOPY  2027   • Pneumococcal Vaccine 0-64  Aged Out   • MENINGOCOCCAL VACCINE  Aged Out       The additional following portions of the patient's history were reviewed and updated as appropriate: allergies, current medications, past family history, past medical history, past social history, past surgical history and problem list.    Review of Systems   Constitutional: Negative.    HENT: Negative.    Eyes: Negative.    Respiratory: Negative.    Cardiovascular: Negative.    Gastrointestinal: Positive for abdominal distention (bloating).   Endocrine: Positive for heat intolerance (night sweats/hot flashes).   Genitourinary: Negative.  Positive for pelvic pressure.   Musculoskeletal: Negative.    Skin: Negative.    Allergic/Immunologic: Negative.    Neurological: Negative.    Hematological: Negative.    Psychiatric/Behavioral: Positive for sleep disturbance. The patient is nervous/anxious.        I have reviewed and agree with the HPI, ROS, and historical information as entered above. Tash Amador MD    Objective   /76   Ht 163.8 cm (64.5\")   Wt 78 kg (172 lb)   LMP 10/01/2019   BMI 29.07 kg/m²     Physical Exam  Vitals signs and nursing note reviewed. Exam conducted with a chaperone present.   Constitutional:       Appearance: She is well-developed.   HENT:      Head: Normocephalic and atraumatic.   Neck:      Musculoskeletal: Normal range of motion. No muscular tenderness.      Thyroid: " No thyroid mass or thyromegaly.   Cardiovascular:      Rate and Rhythm: Normal rate and regular rhythm.      Heart sounds: No murmur.   Pulmonary:      Effort: Pulmonary effort is normal. No retractions.      Breath sounds: Normal breath sounds. No wheezing, rhonchi or rales.   Chest:      Chest wall: No mass or tenderness.      Breasts:         Right: Normal. No mass, nipple discharge, skin change or tenderness.         Left: Normal. No mass, nipple discharge, skin change or tenderness.   Abdominal:      General: Bowel sounds are normal.      Palpations: Abdomen is soft. Abdomen is not rigid. There is no mass.      Tenderness: There is no abdominal tenderness. There is no guarding.      Hernia: No hernia is present. There is no hernia in the left inguinal area.   Genitourinary:     Labia:         Right: No rash, tenderness or lesion.         Left: No rash, tenderness or lesion.       Vagina: Normal. No vaginal discharge or lesions.      Cervix: No cervical motion tenderness, discharge, lesion or cervical bleeding.      Uterus: Normal. Not enlarged, not fixed and not tender.       Adnexa:         Right: No mass or tenderness.          Left: No mass or tenderness.        Rectum: No external hemorrhoid.   Neurological:      Mental Status: She is alert and oriented to person, place, and time.   Psychiatric:         Behavior: Behavior normal.         Assessment/Plan     Encounter Diagnoses   Name Primary?   • Women's annual routine gynecological examination Yes   • Encounter for breast cancer screening using non-mammogram modality    • Menopausal symptoms    • Bloating          Recommended use of Vitamin D replacement and getting adequate calcium in her diet. (1500mg)  Reviewed monthly self breast exams.  Instructed to call with lumps, pain, or breast discharge.    Strongly encouraged to start yearly mammography  Reviewed HPV guidelines.  Reviewed exercise as a preventative health measures.   Reviewed risks/benefits of  HRT, including possible increased risk of breast cancer, heart disease, blood clots and strokes. Patient strongly wants to stay on HRT  She would like to start.  Bloating - will return for u/s.  Labs today  Insomnia - discussed options and will try Lunesta for 2 weeks only until HRT kicks in.    Tash Amador MD   02/09/2021

## 2021-02-10 LAB
25(OH)D3+25(OH)D2 SERPL-MCNC: 32.6 NG/ML (ref 30–100)
ALBUMIN SERPL-MCNC: 4.4 G/DL (ref 3.5–5.2)
ALBUMIN/GLOB SERPL: 1.8 G/DL
ALP SERPL-CCNC: 74 U/L (ref 39–117)
ALT SERPL-CCNC: 11 U/L (ref 1–33)
AST SERPL-CCNC: 16 U/L (ref 1–32)
BILIRUB SERPL-MCNC: 0.2 MG/DL (ref 0–1.2)
BUN SERPL-MCNC: 19 MG/DL (ref 6–20)
BUN/CREAT SERPL: 24.4 (ref 7–25)
CALCIUM SERPL-MCNC: 9 MG/DL (ref 8.6–10.5)
CHLORIDE SERPL-SCNC: 104 MMOL/L (ref 98–107)
CHOLEST SERPL-MCNC: 216 MG/DL (ref 0–200)
CO2 SERPL-SCNC: 24.4 MMOL/L (ref 22–29)
CREAT SERPL-MCNC: 0.78 MG/DL (ref 0.57–1)
ERYTHROCYTE [DISTWIDTH] IN BLOOD BY AUTOMATED COUNT: 12.5 % (ref 12.3–15.4)
ESTRADIOL SERPL-MCNC: 148 PG/ML
FSH SERPL-ACNC: 45.5 MIU/ML
GLOBULIN SER CALC-MCNC: 2.5 GM/DL
GLUCOSE SERPL-MCNC: 90 MG/DL (ref 65–99)
HBA1C MFR BLD: 5.4 % (ref 4.8–5.6)
HCT VFR BLD AUTO: 40.3 % (ref 34–46.6)
HCV AB S/CO SERPL IA: <0.1 S/CO RATIO (ref 0–0.9)
HDLC SERPL-MCNC: 83 MG/DL (ref 40–60)
HGB BLD-MCNC: 13.4 G/DL (ref 12–15.9)
LDLC SERPL CALC-MCNC: 125 MG/DL (ref 0–100)
MCH RBC QN AUTO: 30 PG (ref 26.6–33)
MCHC RBC AUTO-ENTMCNC: 33.3 G/DL (ref 31.5–35.7)
MCV RBC AUTO: 90.2 FL (ref 79–97)
PLATELET # BLD AUTO: 274 10*3/MM3 (ref 140–450)
POTASSIUM SERPL-SCNC: 4.3 MMOL/L (ref 3.5–5.2)
PROT SERPL-MCNC: 6.9 G/DL (ref 6–8.5)
RBC # BLD AUTO: 4.47 10*6/MM3 (ref 3.77–5.28)
SODIUM SERPL-SCNC: 137 MMOL/L (ref 136–145)
T4 FREE SERPL-MCNC: 1.13 NG/DL (ref 0.93–1.7)
TRIGL SERPL-MCNC: 45 MG/DL (ref 0–150)
TSH SERPL DL<=0.005 MIU/L-ACNC: 2.19 UIU/ML (ref 0.27–4.2)
VLDLC SERPL CALC-MCNC: 8 MG/DL (ref 5–40)
WBC # BLD AUTO: 5.4 10*3/MM3 (ref 3.4–10.8)

## 2021-03-03 ENCOUNTER — APPOINTMENT (OUTPATIENT)
Dept: MAMMOGRAPHY | Facility: HOSPITAL | Age: 51
End: 2021-03-03

## 2021-03-04 ENCOUNTER — TELEPHONE (OUTPATIENT)
Dept: OBSTETRICS AND GYNECOLOGY | Facility: CLINIC | Age: 51
End: 2021-03-04

## 2021-03-04 NOTE — TELEPHONE ENCOUNTER
She is having a normal period and she is menopausal. She only has one ovary. Will cont HRT until her 3/11 US apt. FSH 45.5 and Estradiol 148.

## 2021-03-11 ENCOUNTER — OFFICE VISIT (OUTPATIENT)
Dept: OBSTETRICS AND GYNECOLOGY | Facility: CLINIC | Age: 51
End: 2021-03-11

## 2021-03-11 VITALS
SYSTOLIC BLOOD PRESSURE: 122 MMHG | BODY MASS INDEX: 28.56 KG/M2 | HEIGHT: 65 IN | DIASTOLIC BLOOD PRESSURE: 78 MMHG | WEIGHT: 171.4 LBS

## 2021-03-11 DIAGNOSIS — N84.0 ENDOMETRIAL POLYP: Primary | ICD-10-CM

## 2021-03-11 DIAGNOSIS — Z79.890 HORMONE REPLACEMENT THERAPY (HRT): ICD-10-CM

## 2021-03-11 DIAGNOSIS — N83.201 CYST OF RIGHT OVARY: ICD-10-CM

## 2021-03-11 PROCEDURE — 99213 OFFICE O/P EST LOW 20 MIN: CPT | Performed by: OBSTETRICS & GYNECOLOGY

## 2021-03-11 RX ORDER — ESTRADIOL 0.05 MG/D
1 PATCH, EXTENDED RELEASE TRANSDERMAL 2 TIMES WEEKLY
Qty: 8 PATCH | Refills: 12 | Status: SHIPPED | OUTPATIENT
Start: 2021-03-11 | End: 2021-06-10

## 2021-03-11 NOTE — PROGRESS NOTES
Chief Complaint   Patient presents with   • Follow-up       Subjective   HPI  Ondina Loo is a 50 y.o. female, , who presents for follow up on being starting on the patch and US. Pt states that she started her period 3/1/2021 and is still spotting from it.     The patient reports additional symptoms as none.      Her last LMP was Patient's last menstrual period was 2021 (exact date).. Periods were absent for approx a year until starting the patch. Dysmenorrhea with this period:moderate, occurring first 1-2 days of flow.  Patient reports problems with: none.  Partner Status: Marital Status: .  New Partners since last visit: no.  Desires STD Screening: no.    Additional OB/GYN History   Current contraception: contraceptive methods: Vasectomy   Desires to: do not start contraception  Last Pap :   Last Completed Pap Smear       Status Date      PAP SMEAR Done 2020 neg, neg HPV        History of abnormal Pap smear: no  Last mammogram: several years ago  States has one scheduled for next month  Last Completed Mammogram    Patient has no health maintenance due at this time       Tobacco Usage?: No   OB History        2    Para   1    Term   1            AB   1    Living   1       SAB   1    TAB        Ectopic        Molar        Multiple        Live Births   1                Health Maintenance   Topic Date Due   • ANNUAL PHYSICAL  Never done   • TDAP/TD VACCINES (1 - Tdap) Never done   • INFLUENZA VACCINE  Never done   • Annual Gynecologic Pelvic and Breast Exam  02/10/2022   • PAP SMEAR  2023   • COLONOSCOPY  2027   • HEPATITIS C SCREENING  Completed   • COVID-19 Vaccine  Completed   • Pneumococcal Vaccine 0-64  Aged Out   • MENINGOCOCCAL VACCINE  Aged Out       The additional following portions of the patient's history were reviewed and updated as appropriate: allergies, current medications, past family history, past medical history, past social history, past  "surgical history and problem list.    Review of Systems   All other systems reviewed and are negative.      I have reviewed and agree with the HPI, ROS, and historical information as entered above. Tash Amador MD    Objective   /78 (BP Location: Right arm, Patient Position: Sitting, Cuff Size: Adult)   Ht 163.8 cm (64.5\")   Wt 77.7 kg (171 lb 6.4 oz)   LMP 03/01/2021 (Exact Date)   Breastfeeding No   BMI 28.97 kg/m²     Physical Exam  Constitutional:       Appearance: She is well-developed.   HENT:      Head: Normocephalic.   Eyes:      Conjunctiva/sclera: Conjunctivae normal.   Pulmonary:      Effort: Pulmonary effort is normal.   Psychiatric:         Behavior: Behavior normal.         Assessment/Plan     Assessment     Problem List Items Addressed This Visit     None      Visit Diagnoses     Endometrial polyp    -  Primary    Relevant Orders    US Non-ob Transvaginal    Hormone replacement therapy (HRT)        Cyst of right ovary        Relevant Orders    US Non-ob Transvaginal           Plan     1.  DUB with new HRT plan - reviewed u/s and findings of endometrial polyp.  Discussed not unusual to bleed some on new HRT regimen.  2.  Ovarian cyst - repeat us in 3 months.  3.  She feels the .1 dose of vivelle may be affecting her mood.  Will lower to .05.       Tash Amador MD  03/11/2021  "

## 2021-04-08 ENCOUNTER — HOSPITAL ENCOUNTER (OUTPATIENT)
Dept: MAMMOGRAPHY | Facility: HOSPITAL | Age: 51
Discharge: HOME | End: 2021-04-08
Admitting: OBSTETRICS & GYNECOLOGY

## 2021-04-08 DIAGNOSIS — Z12.39 ENCOUNTER FOR BREAST CANCER SCREENING USING NON-MAMMOGRAM MODALITY: ICD-10-CM

## 2021-04-08 PROCEDURE — 77067 SCR MAMMO BI INCL CAD: CPT | Performed by: RADIOLOGY

## 2021-04-08 PROCEDURE — 77063 BREAST TOMOSYNTHESIS BI: CPT | Performed by: RADIOLOGY

## 2021-04-08 PROCEDURE — 77067 SCR MAMMO BI INCL CAD: CPT

## 2021-04-08 PROCEDURE — 77063 BREAST TOMOSYNTHESIS BI: CPT

## 2021-06-10 ENCOUNTER — OFFICE VISIT (OUTPATIENT)
Dept: OBSTETRICS AND GYNECOLOGY | Facility: CLINIC | Age: 51
End: 2021-06-10

## 2021-06-10 VITALS
SYSTOLIC BLOOD PRESSURE: 140 MMHG | DIASTOLIC BLOOD PRESSURE: 80 MMHG | HEIGHT: 65 IN | BODY MASS INDEX: 28.49 KG/M2 | WEIGHT: 171 LBS

## 2021-06-10 DIAGNOSIS — Z79.890 HORMONE REPLACEMENT THERAPY (HRT): Primary | ICD-10-CM

## 2021-06-10 DIAGNOSIS — N83.201 CYST OF RIGHT OVARY: ICD-10-CM

## 2021-06-10 PROCEDURE — 99213 OFFICE O/P EST LOW 20 MIN: CPT | Performed by: OBSTETRICS & GYNECOLOGY

## 2021-06-10 RX ORDER — PROGESTERONE 200 MG/1
200 CAPSULE ORAL DAILY
Qty: 30 CAPSULE | Refills: 11 | Status: SHIPPED | OUTPATIENT
Start: 2021-06-10 | End: 2022-02-22

## 2021-06-10 RX ORDER — ESTRADIOL 1.53 MG/1
1 SPRAY TRANSDERMAL DAILY
Qty: 8.1 ML | Refills: 12 | Status: SHIPPED | OUTPATIENT
Start: 2021-06-10 | End: 2022-02-22

## 2021-06-10 NOTE — PROGRESS NOTES
Chief Complaint   Patient presents with   • Follow-up       Subjective   HPI  Ondina Loo is a 50 y.o. female, , who presents for follow up US and evaluation for ovarian cyst and fibroids. She was last seen on 3/11/21. She had to stop the HRT patch 2 weeks ago due to increased H/A's. She states she had 2 full blown periods on the HRT. She had some spotting yest.      Additional OB/GYN History   Current contraception: contraceptive methods: Tubal ligation  Desires to: continue contraception  Last Pap :   Last Completed Pap Smear          PAP SMEAR (Every 3 Years) Next due on 2020  Done - neg, neg HPV              History of abnormal Pap smear: no  Last mammogram:   Last Completed Mammogram          MAMMOGRAM (Yearly) Next due on 2021  Mammo Screening Digital Tomosynthesis Bilateral With CAD    2014  MAMMOGRAPHY SCREENING BILATERAL              Tobacco Usage?: No   OB History        2    Para   1    Term   1            AB   1    Living   1       SAB   1    TAB        Ectopic        Molar        Multiple        Live Births   1                Health Maintenance   Topic Date Due   • ANNUAL PHYSICAL  Never done   • TDAP/TD VACCINES (1 - Tdap) Never done   • INFLUENZA VACCINE  2021   • Annual Gynecologic Pelvic and Breast Exam  02/10/2022   • MAMMOGRAM  2022   • PAP SMEAR  2023   • COLORECTAL CANCER SCREENING  2027   • HEPATITIS C SCREENING  Completed   • COVID-19 Vaccine  Completed   • Pneumococcal Vaccine 0-64  Aged Out       The additional following portions of the patient's history were reviewed and updated as appropriate: allergies, current medications, past family history, past medical history, past social history, past surgical history and problem list.    Review of Systems   Constitutional: Negative.    HENT: Negative.    Respiratory: Negative.    Cardiovascular: Negative.    Gastrointestinal: Negative.    Genitourinary:  "Negative.    Musculoskeletal: Negative.    Skin: Negative.    Allergic/Immunologic: Negative.    Neurological: Negative.    Hematological: Negative.    Psychiatric/Behavioral: Negative.        I have reviewed and agree with the HPI, ROS, and historical information as entered above. Tash Amador MD    Objective   /80   Ht 163.8 cm (64.5\")   Wt 77.6 kg (171 lb)   LMP 06/09/2021   BMI 28.90 kg/m²     Physical Exam  Constitutional:       Appearance: She is well-developed.   HENT:      Head: Normocephalic.   Eyes:      Conjunctiva/sclera: Conjunctivae normal.   Pulmonary:      Effort: Pulmonary effort is normal.   Psychiatric:         Behavior: Behavior normal.         Assessment/Plan      Encounter Diagnoses   Name Primary?   • Hormone replacement therapy (HRT) Yes   • Cyst of right ovary            Plan     1  Hot flashes had improved on the vivelle patch but her QUIROGA were too bad especially when changed to a generic.  Discussed options and reviewed labs.  We can try to lower the dose of the estrogen and increase her prometrium to try to get better bleeding profile, help headaches and improve her hot flashes.  SHe is in agreement.    Return in about 3 months (around 9/10/2021).      Tash Amador MD  06/10/2021  "

## 2022-02-22 ENCOUNTER — OFFICE VISIT (OUTPATIENT)
Dept: OBSTETRICS AND GYNECOLOGY | Facility: CLINIC | Age: 52
End: 2022-02-22

## 2022-02-22 VITALS
DIASTOLIC BLOOD PRESSURE: 70 MMHG | WEIGHT: 176.4 LBS | BODY MASS INDEX: 29.39 KG/M2 | HEIGHT: 65 IN | SYSTOLIC BLOOD PRESSURE: 122 MMHG

## 2022-02-22 DIAGNOSIS — Z12.39 ENCOUNTER FOR BREAST CANCER SCREENING USING NON-MAMMOGRAM MODALITY: ICD-10-CM

## 2022-02-22 DIAGNOSIS — Z01.419 WOMEN'S ANNUAL ROUTINE GYNECOLOGICAL EXAMINATION: Primary | ICD-10-CM

## 2022-02-22 DIAGNOSIS — R10.31 RLQ ABDOMINAL PAIN: ICD-10-CM

## 2022-02-22 DIAGNOSIS — D25.1 LEIOMYOMA, INTRAMURAL: ICD-10-CM

## 2022-02-22 DIAGNOSIS — N95.1 MENOPAUSAL SYMPTOMS: ICD-10-CM

## 2022-02-22 PROCEDURE — 99396 PREV VISIT EST AGE 40-64: CPT | Performed by: OBSTETRICS & GYNECOLOGY

## 2022-02-22 NOTE — PROGRESS NOTES
GYN Annual Exam     CC - Here for annual exam. She has not had a hysterectomy and does have one or both ovaries (hx of left oophorectomy).    Subjective   HPI  Ondina Loo is a 51 y.o. female, , who presents for annual well woman exam.  She is perimenopausal. Her last LMP was Patient's last menstrual period was 2021. Patient states her periods are rare and when she has them they are heavy in flow with cramping. She reports she had not had a period in over a year and then started having them once she was started on hormones following her last visit in  in . She stopped taking the hormones due to bad/ increased frequency of migraines. She is currently not on any hormones.   Patient reports problems with: hot flashes, night sweats, occasional right lower pelvic pain. Partner Status: Marital Status: .  New Partners since last visit: no Desires STD Screening: no       no pap today     Last mammogram:    Last Completed Mammogram          MAMMOGRAM (Yearly) Next due on 2021  Mammo Screening Digital Tomosynthesis Bilateral With CAD    2014  MAMMOGRAPHY SCREENING BILATERAL                Last colonoscopy :    Last Completed Colonoscopy          COLORECTAL CANCER SCREENING (COLONOSCOPY - Every 10 Years) Next due on 2017  COLONOSCOPY (Done)                BDS: was not indicated      Additional OB/GYN History     Current contraception: contraceptive methods: Vasectomy   Desires to: continue contraception  Last Pap :   Last Completed Pap Smear          PAP SMEAR (Every 3 Years) Next due on 2020  Done - neg, neg HPV              History of abnormal Pap smear: no  Family history of uterine, colon, breast, or ovarian cancer: yes - MGM- breast, PAunt- breast   Performs monthly Self-Breast Exam: yes  Parental Hip Fracture: no  Exercises Regularly: yes - walking on treadmill  Feelings of Anxiety or Depression: no    Tobacco Usage?: No   OB  "History        2    Para   1    Term   1            AB   1    Living   1       SAB   1    IAB        Ectopic        Molar        Multiple        Live Births   1                Health Maintenance   Topic Date Due   • ANNUAL PHYSICAL  Never done   • TDAP/TD VACCINES (1 - Tdap) Never done   • INFLUENZA VACCINE  Never done   • MAMMOGRAM  2022   • PAP SMEAR  2023   • Annual Gynecologic Pelvic and Breast Exam  2023   • COLORECTAL CANCER SCREENING  2027   • HEPATITIS C SCREENING  Completed   • COVID-19 Vaccine  Completed   • Pneumococcal Vaccine 0-64  Aged Out       The additional following portions of the patient's history were reviewed and updated as appropriate: allergies, current medications, past family history, past medical history, past social history, past surgical history and problem list.    Review of Systems   Constitutional: Negative.    HENT: Negative.    Eyes: Negative.    Respiratory: Negative.    Cardiovascular: Negative.    Gastrointestinal: Negative.    Endocrine: Heat intolerance: hot flashes, night sweats.   Genitourinary: Positive for pelvic pain (right lower pelvic pain).   Musculoskeletal: Negative.    Skin: Negative.    Allergic/Immunologic: Negative.    Neurological: Negative.    Hematological: Negative.    Psychiatric/Behavioral: Negative.        I have reviewed and agree with the HPI, ROS, and historical information as entered above. Tash Amador MD    Objective   /70   Ht 163.8 cm (64.5\")   Wt 80 kg (176 lb 6.4 oz)   LMP 2021   BMI 29.81 kg/m²     Physical Exam  Vitals and nursing note reviewed. Exam conducted with a chaperone present.   Constitutional:       Appearance: She is well-developed.   HENT:      Head: Normocephalic and atraumatic.   Neck:      Thyroid: No thyroid mass or thyromegaly.   Cardiovascular:      Rate and Rhythm: Normal rate and regular rhythm.      Heart sounds: No murmur heard.      Pulmonary:      Effort: Pulmonary " effort is normal. No retractions.      Breath sounds: Normal breath sounds. No wheezing, rhonchi or rales.   Chest:      Chest wall: No mass or tenderness.   Breasts:      Right: Normal. No mass, nipple discharge, skin change or tenderness.      Left: Normal. No mass, nipple discharge, skin change or tenderness.       Abdominal:      General: Bowel sounds are normal.      Palpations: Abdomen is soft. Abdomen is not rigid. There is no mass.      Tenderness: There is no abdominal tenderness. There is no guarding.      Hernia: No hernia is present. There is no hernia in the left inguinal area.   Genitourinary:     Labia:         Right: No rash, tenderness or lesion.         Left: No rash, tenderness or lesion.       Vagina: Normal. No vaginal discharge or lesions.      Cervix: No cervical motion tenderness, discharge, lesion or cervical bleeding.      Uterus: Normal. Not enlarged, not fixed and not tender.       Adnexa:         Right: No mass or tenderness.          Left: No mass or tenderness.        Rectum: No external hemorrhoid.   Musculoskeletal:      Cervical back: Normal range of motion. No muscular tenderness.   Neurological:      Mental Status: She is alert and oriented to person, place, and time.   Psychiatric:         Behavior: Behavior normal.         Assessment/Plan     Encounter Diagnoses   Name Primary?   • Women's annual routine gynecological examination Yes   • Encounter for breast cancer screening using non-mammogram modality    • Menopausal symptoms    • Leiomyoma, intramural    • RLQ abdominal pain          1. Recommended use of Vitamin D replacement and getting adequate calcium in her diet. (1500mg)  2. Reviewed monthly self breast exams.  Instructed to call with lumps, pain, or breast discharge.    3. Continue yearly mammography  4. Reviewed HPV guidelines.  5. Reviewed exercise as a preventative health measures.   6. Anticipatory menopausal guidance given.  Discussed options both OTC, non  hormonal and hormonal.  She tried hormones last year and feels better off of them.  Current sx are manageable.   7. RLQ pain - will return for u/s.  8. Return in about 2 weeks (around 3/8/2022) for US with Next Visit.     aTsh Amador MD   02/22/2022

## 2022-03-15 ENCOUNTER — OFFICE VISIT (OUTPATIENT)
Dept: OBSTETRICS AND GYNECOLOGY | Facility: CLINIC | Age: 52
End: 2022-03-15

## 2022-03-15 VITALS — WEIGHT: 175.2 LBS | SYSTOLIC BLOOD PRESSURE: 104 MMHG | DIASTOLIC BLOOD PRESSURE: 60 MMHG | BODY MASS INDEX: 29.61 KG/M2

## 2022-03-15 DIAGNOSIS — D25.1 INTRAMURAL LEIOMYOMA OF UTERUS: ICD-10-CM

## 2022-03-15 DIAGNOSIS — N83.201 CYST OF RIGHT OVARY: ICD-10-CM

## 2022-03-15 DIAGNOSIS — N83.8 OVARIAN MASS, RIGHT: Primary | ICD-10-CM

## 2022-03-15 DIAGNOSIS — R53.83 FATIGUE, UNSPECIFIED TYPE: ICD-10-CM

## 2022-03-15 PROCEDURE — 99214 OFFICE O/P EST MOD 30 MIN: CPT | Performed by: OBSTETRICS & GYNECOLOGY

## 2022-03-16 LAB
25(OH)D3+25(OH)D2 SERPL-MCNC: 36.1 NG/ML (ref 30–100)
ALBUMIN SERPL-MCNC: 4.8 G/DL (ref 3.8–4.8)
ALBUMIN/GLOB SERPL: 1.7 {RATIO} (ref 1.2–2.2)
ALP SERPL-CCNC: 93 IU/L (ref 44–121)
ALT SERPL-CCNC: 14 IU/L (ref 0–32)
AST SERPL-CCNC: 19 IU/L (ref 0–40)
BILIRUB SERPL-MCNC: 0.3 MG/DL (ref 0–1.2)
BUN SERPL-MCNC: 23 MG/DL (ref 6–24)
BUN/CREAT SERPL: 26 (ref 9–23)
CALCIUM SERPL-MCNC: 9.7 MG/DL (ref 8.7–10.2)
CANCER AG125 SERPL-ACNC: 6.9 U/ML (ref 0–38.1)
CHLORIDE SERPL-SCNC: 102 MMOL/L (ref 96–106)
CO2 SERPL-SCNC: 21 MMOL/L (ref 20–29)
CREAT SERPL-MCNC: 0.9 MG/DL (ref 0.57–1)
EGFRCR SERPLBLD CKD-EPI 2021: 78 ML/MIN/1.73
ERYTHROCYTE [DISTWIDTH] IN BLOOD BY AUTOMATED COUNT: 12.7 % (ref 11.7–15.4)
FOLATE SERPL-MCNC: 9.5 NG/ML
GLOBULIN SER CALC-MCNC: 2.9 G/DL (ref 1.5–4.5)
GLUCOSE SERPL-MCNC: 84 MG/DL (ref 65–99)
HBA1C MFR BLD: 5.6 % (ref 4.8–5.6)
HCT VFR BLD AUTO: 42.6 % (ref 34–46.6)
HCV AB S/CO SERPL IA: <0.1 S/CO RATIO (ref 0–0.9)
HGB BLD-MCNC: 14.4 G/DL (ref 11.1–15.9)
MCH RBC QN AUTO: 30.8 PG (ref 26.6–33)
MCHC RBC AUTO-ENTMCNC: 33.8 G/DL (ref 31.5–35.7)
MCV RBC AUTO: 91 FL (ref 79–97)
PLATELET # BLD AUTO: 341 X10E3/UL (ref 150–450)
POTASSIUM SERPL-SCNC: 3.9 MMOL/L (ref 3.5–5.2)
PROT SERPL-MCNC: 7.7 G/DL (ref 6–8.5)
RBC # BLD AUTO: 4.68 X10E6/UL (ref 3.77–5.28)
SODIUM SERPL-SCNC: 142 MMOL/L (ref 134–144)
T4 FREE SERPL-MCNC: 1.27 NG/DL (ref 0.82–1.77)
TSH SERPL DL<=0.005 MIU/L-ACNC: 3.28 UIU/ML (ref 0.45–4.5)
VIT B12 SERPL-MCNC: 384 PG/ML (ref 232–1245)
WBC # BLD AUTO: 8.1 X10E3/UL (ref 3.4–10.8)

## 2022-05-04 ENCOUNTER — TELEPHONE (OUTPATIENT)
Dept: OBSTETRICS AND GYNECOLOGY | Facility: CLINIC | Age: 52
End: 2022-05-04

## 2022-05-04 DIAGNOSIS — R10.2 PELVIC PRESSURE IN FEMALE: Primary | ICD-10-CM

## 2022-05-04 NOTE — TELEPHONE ENCOUNTER
S/w pt she states she has been having increased pelvic pressure/discomfort and right pelvic pain that is constant and has worsened since her last appt with Dr. Amador on 3/15/22. Patient states she has been taking ibuprofen 600mg PRN and has been helping some, increased diarrhea as well.     Patient denies using a heating pad, vaginal bleeding, urinary symptoms, constipation.     Patient has upcoming F/U for this on 6/14 with LOS and U/S. I advised patient to come in sooner than that since her s/s have worsened. appt scheduled and U/S ordered.

## 2022-05-04 NOTE — TELEPHONE ENCOUNTER
Pt called and stated that she is having some pelvic pressure and some pain and pt was asking if she needed to be seen before her appt in [place]

## 2022-05-12 ENCOUNTER — OFFICE VISIT (OUTPATIENT)
Dept: OBSTETRICS AND GYNECOLOGY | Facility: CLINIC | Age: 52
End: 2022-05-12

## 2022-05-12 VITALS
HEIGHT: 65 IN | WEIGHT: 178 LBS | SYSTOLIC BLOOD PRESSURE: 120 MMHG | BODY MASS INDEX: 29.66 KG/M2 | DIASTOLIC BLOOD PRESSURE: 70 MMHG

## 2022-05-12 DIAGNOSIS — D25.1 INTRAMURAL LEIOMYOMA OF UTERUS: ICD-10-CM

## 2022-05-12 DIAGNOSIS — R10.31 RLQ ABDOMINAL PAIN: ICD-10-CM

## 2022-05-12 DIAGNOSIS — Z86.03: Primary | ICD-10-CM

## 2022-05-12 PROCEDURE — 99213 OFFICE O/P EST LOW 20 MIN: CPT | Performed by: OBSTETRICS & GYNECOLOGY

## 2022-05-12 NOTE — PROGRESS NOTES
Chief Complaint   Patient presents with   • Pelvic Pain       Subjective   HPI  Ondina Loo is a 51 y.o. female, , who presents for follow up u/s-h/o ovarian cyst. Patient in office 3/2022 with possible dermoid. She had f/u scheduled 2020 but moved to today due to increased pain. She is c/o acute onset of RLQ pain 22. Describes as pressure-worse with movement. States has had intermittent severe episodes since. Some relief with ibuprofen and mobic. She is also c/o increased pain with full bladder and diarrhea-but states may be related to starting magnesium supplement.       Her last LMP was Patient's last menstrual period was 2021..  Periods are absent, lasting 0 days.  Dysmenorrhea:none.  Patient reports problems with: pelvic pain.  Partner Status: Marital Status: .  New Partners since last visit: no.  Desires STD Screening: no.    Additional OB/GYN History   Last Pap :   Last Completed Pap Smear          PAP SMEAR (Every 3 Years) Next due on 2020  Done - neg, neg HPV              History of abnormal Pap smear: no  Last mammogram:   Last Completed Mammogram     This patient has no relevant Health Maintenance data.        Tobacco Usage?: No   OB History        2    Para   1    Term   1            AB   1    Living   1       SAB   1    IAB        Ectopic        Molar        Multiple        Live Births   1                Health Maintenance   Topic Date Due   • ANNUAL PHYSICAL  Never done   • TDAP/TD VACCINES (1 - Tdap) Never done   • MAMMOGRAM  2022   • INFLUENZA VACCINE  2022   • PAP SMEAR  2023   • Annual Gynecologic Pelvic and Breast Exam  2023   • COLORECTAL CANCER SCREENING  2027   • HEPATITIS C SCREENING  Completed   • COVID-19 Vaccine  Completed   • Pneumococcal Vaccine 0-64  Aged Out       The additional following portions of the patient's history were reviewed and updated as appropriate: allergies, current medications,  "past family history, past medical history, past social history and past surgical history.    Review of Systems   Constitutional: Negative.    HENT: Negative.    Respiratory: Negative.    Cardiovascular: Negative.    Gastrointestinal: Negative.    Genitourinary: Positive for pelvic pain.   Musculoskeletal: Negative.    Skin: Negative.    Allergic/Immunologic: Negative.    Neurological: Negative.    Hematological: Negative.    Psychiatric/Behavioral: Negative.        I have reviewed and agree with the HPI, ROS, and historical information as entered above. Tash Amador MD    Objective   /70   Ht 163.8 cm (64.5\")   Wt 80.7 kg (178 lb)   LMP 06/09/2021   BMI 30.08 kg/m²     Physical Exam  Constitutional:       Appearance: She is well-developed.   HENT:      Head: Normocephalic.   Eyes:      Conjunctiva/sclera: Conjunctivae normal.   Pulmonary:      Effort: Pulmonary effort is normal.   Psychiatric:         Behavior: Behavior normal.         Assessment & Plan     Assessment     Problem List Items Addressed This Visit    None     Visit Diagnoses     History of neoplasm of uncertain behavior of ovary    -  Primary    Relevant Orders     (Completed)    Intramural leiomyoma of uterus        RLQ abdominal pain              Plan     1.  Reviewed u/s findings.  SHe is planning to proceed with definitive hysterectomy when schedule allows.  Will check  in meantime and plan TLH/BSO    Tash Amador MD  05/12/2022    "

## 2022-05-13 LAB — CANCER AG125 SERPL-ACNC: 7.8 U/ML (ref 0–38.1)

## 2022-05-27 ENCOUNTER — TELEPHONE (OUTPATIENT)
Dept: OBSTETRICS AND GYNECOLOGY | Facility: CLINIC | Age: 52
End: 2022-05-27

## 2022-05-27 NOTE — TELEPHONE ENCOUNTER
LVM for patient to return my call to see when she will be able to scheudle TLH/BSO per dr dyer, direct line was given

## 2022-07-15 ENCOUNTER — TRANSCRIBE ORDERS (OUTPATIENT)
Dept: ADMINISTRATIVE | Facility: HOSPITAL | Age: 52
End: 2022-07-15

## 2022-07-15 ENCOUNTER — HOSPITAL ENCOUNTER (OUTPATIENT)
Dept: GENERAL RADIOLOGY | Facility: HOSPITAL | Age: 52
Discharge: HOME | End: 2022-07-15
Admitting: PHYSICIAN ASSISTANT

## 2022-07-15 DIAGNOSIS — S79.911A HIP INJURY, RIGHT, INITIAL ENCOUNTER: ICD-10-CM

## 2022-07-15 DIAGNOSIS — S79.911A HIP INJURY, RIGHT, INITIAL ENCOUNTER: Primary | ICD-10-CM

## 2022-07-15 PROCEDURE — 73502 X-RAY EXAM HIP UNI 2-3 VIEWS: CPT

## 2022-09-07 ENCOUNTER — OFFICE VISIT (OUTPATIENT)
Dept: OBSTETRICS AND GYNECOLOGY | Facility: CLINIC | Age: 52
End: 2022-09-07

## 2022-09-07 ENCOUNTER — PREP FOR SURGERY (OUTPATIENT)
Dept: OTHER | Facility: HOSPITAL | Age: 52
End: 2022-09-07

## 2022-09-07 ENCOUNTER — PRE-ADMISSION TESTING (OUTPATIENT)
Dept: PREADMISSION TESTING | Facility: HOSPITAL | Age: 52
End: 2022-09-07

## 2022-09-07 VITALS
SYSTOLIC BLOOD PRESSURE: 126 MMHG | DIASTOLIC BLOOD PRESSURE: 82 MMHG | BODY MASS INDEX: 29.29 KG/M2 | WEIGHT: 175.8 LBS | HEIGHT: 65 IN

## 2022-09-07 VITALS — WEIGHT: 176.37 LBS | HEIGHT: 64 IN | BODY MASS INDEX: 30.11 KG/M2

## 2022-09-07 DIAGNOSIS — N93.9 ABNORMAL UTERINE BLEEDING (AUB): Primary | ICD-10-CM

## 2022-09-07 DIAGNOSIS — D25.1 INTRAMURAL LEIOMYOMA OF UTERUS: ICD-10-CM

## 2022-09-07 DIAGNOSIS — R10.2 PELVIC PAIN: ICD-10-CM

## 2022-09-07 DIAGNOSIS — D25.1 INTRAMURAL LEIOMYOMA OF UTERUS: Primary | ICD-10-CM

## 2022-09-07 DIAGNOSIS — N83.209 CYST OF OVARY, UNSPECIFIED LATERALITY: ICD-10-CM

## 2022-09-07 PROBLEM — D21.9 FIBROIDS: Status: ACTIVE | Noted: 2022-09-07

## 2022-09-07 LAB
ABO GROUP BLD: NORMAL
ANION GAP SERPL CALCULATED.3IONS-SCNC: 9 MMOL/L (ref 5–15)
BASOPHILS # BLD AUTO: 0.08 10*3/MM3 (ref 0–0.2)
BASOPHILS NFR BLD AUTO: 1.3 % (ref 0–1.5)
BLD GP AB SCN SERPL QL: NEGATIVE
BUN SERPL-MCNC: 21 MG/DL (ref 6–20)
BUN/CREAT SERPL: 26.3 (ref 7–25)
CALCIUM SPEC-SCNC: 9.8 MG/DL (ref 8.6–10.5)
CHLORIDE SERPL-SCNC: 107 MMOL/L (ref 98–107)
CO2 SERPL-SCNC: 24 MMOL/L (ref 22–29)
CREAT SERPL-MCNC: 0.8 MG/DL (ref 0.57–1)
DEPRECATED RDW RBC AUTO: 45.6 FL (ref 37–54)
EGFRCR SERPLBLD CKD-EPI 2021: 89.9 ML/MIN/1.73
EOSINOPHIL # BLD AUTO: 0.4 10*3/MM3 (ref 0–0.4)
EOSINOPHIL NFR BLD AUTO: 6.3 % (ref 0.3–6.2)
ERYTHROCYTE [DISTWIDTH] IN BLOOD BY AUTOMATED COUNT: 13.1 % (ref 12.3–15.4)
GLUCOSE SERPL-MCNC: 109 MG/DL (ref 65–99)
HBA1C MFR BLD: 5.6 % (ref 4.8–5.6)
HCT VFR BLD AUTO: 42.3 % (ref 34–46.6)
HGB BLD-MCNC: 14.1 G/DL (ref 12–15.9)
IMM GRANULOCYTES # BLD AUTO: 0.01 10*3/MM3 (ref 0–0.05)
IMM GRANULOCYTES NFR BLD AUTO: 0.2 % (ref 0–0.5)
LYMPHOCYTES # BLD AUTO: 2.5 10*3/MM3 (ref 0.7–3.1)
LYMPHOCYTES NFR BLD AUTO: 39.4 % (ref 19.6–45.3)
MCH RBC QN AUTO: 31.4 PG (ref 26.6–33)
MCHC RBC AUTO-ENTMCNC: 33.3 G/DL (ref 31.5–35.7)
MCV RBC AUTO: 94.2 FL (ref 79–97)
MONOCYTES # BLD AUTO: 0.5 10*3/MM3 (ref 0.1–0.9)
MONOCYTES NFR BLD AUTO: 7.9 % (ref 5–12)
NEUTROPHILS NFR BLD AUTO: 2.85 10*3/MM3 (ref 1.7–7)
NEUTROPHILS NFR BLD AUTO: 44.9 % (ref 42.7–76)
NRBC BLD AUTO-RTO: 0 /100 WBC (ref 0–0.2)
PLATELET # BLD AUTO: 300 10*3/MM3 (ref 140–450)
PMV BLD AUTO: 10 FL (ref 6–12)
POTASSIUM SERPL-SCNC: 4.5 MMOL/L (ref 3.5–5.2)
RBC # BLD AUTO: 4.49 10*6/MM3 (ref 3.77–5.28)
RH BLD: POSITIVE
SODIUM SERPL-SCNC: 140 MMOL/L (ref 136–145)
T&S EXPIRATION DATE: NORMAL
WBC NRBC COR # BLD: 6.34 10*3/MM3 (ref 3.4–10.8)

## 2022-09-07 PROCEDURE — 86901 BLOOD TYPING SEROLOGIC RH(D): CPT

## 2022-09-07 PROCEDURE — 36415 COLL VENOUS BLD VENIPUNCTURE: CPT

## 2022-09-07 PROCEDURE — 86900 BLOOD TYPING SEROLOGIC ABO: CPT

## 2022-09-07 PROCEDURE — 86850 RBC ANTIBODY SCREEN: CPT

## 2022-09-07 PROCEDURE — 99213 OFFICE O/P EST LOW 20 MIN: CPT | Performed by: OBSTETRICS & GYNECOLOGY

## 2022-09-07 PROCEDURE — 80048 BASIC METABOLIC PNL TOTAL CA: CPT

## 2022-09-07 PROCEDURE — 85025 COMPLETE CBC W/AUTO DIFF WBC: CPT

## 2022-09-07 PROCEDURE — 83036 HEMOGLOBIN GLYCOSYLATED A1C: CPT

## 2022-09-07 RX ORDER — SODIUM CHLORIDE 0.9 % (FLUSH) 0.9 %
10 SYRINGE (ML) INJECTION AS NEEDED
Status: CANCELLED | OUTPATIENT
Start: 2022-09-07

## 2022-09-07 RX ORDER — ACETAMINOPHEN 500 MG
1000 TABLET ORAL ONCE
Status: CANCELLED | OUTPATIENT
Start: 2022-09-07 | End: 2022-09-07

## 2022-09-07 RX ORDER — GABAPENTIN 300 MG/1
600 CAPSULE ORAL ONCE
Status: CANCELLED | OUTPATIENT
Start: 2022-09-07 | End: 2022-09-07

## 2022-09-07 RX ORDER — SODIUM CHLORIDE 0.9 % (FLUSH) 0.9 %
3 SYRINGE (ML) INJECTION EVERY 12 HOURS SCHEDULED
Status: CANCELLED | OUTPATIENT
Start: 2022-09-07

## 2022-09-07 RX ORDER — SCOLOPAMINE TRANSDERMAL SYSTEM 1 MG/1
1 PATCH, EXTENDED RELEASE TRANSDERMAL CONTINUOUS
Status: CANCELLED | OUTPATIENT
Start: 2022-09-07 | End: 2022-09-10

## 2022-09-07 NOTE — PAT
Patient to apply Chlorhexadine wipes  to surgical area (as instructed) the night before procedure and the AM of procedure. Wipes provided.    Patient viewed general PAT education video as instructed in their preoperative information received from their surgeon.  Patient stated the general PAT education video was viewed in its entirety and survey completed.  Copies of [facility] general education handouts (Incentive Spirometry, Meds to Beds Program, Patient Belongings, Pre-op skin preparation instructions, Blood Glucose testing, Visitor policy, Surgery FAQ, Code H) distributed to patient if not printed. Education related to the PAT pass and skin preparation for surgery (if applicable) completed in [facility] as a reinforcement to PAT education video. Patient instructed to return PAT pass provided today as well as completed skin preparation sheet (if applicable) on the day of procedure.     Additionally if patient had not viewed video yet but intended to view it at home or in our waiting area, then referred them to the handout with QR code/link provided during PAT visit.  Instructed patient to complete survey after viewing the video in its entirety.  Encouraged patient/family to read [facility] general education handouts thoroughly and notify PAT staff with any questions or concerns. Patient verbalized understanding of all information and priority content.    It was noted during Pre Admission Testing that patient was wearing some form of fingernail polish (gel/regular) and/or acrylic/artificial nails.  Patient was told that polish and/or artificial nails must be removed for surgery.  If a patient had recent manicure, and would rather not remove polish or artificial nails. Then the minimum requirement is that the polish/artificial nails must be removed from the middle finger on each hand.  Patient verbalized understanding.  If patient was having surgery on an upper extremity, then the patient was instructed that fingernail  polish/artificial fingernails must be removed for surgery.  NO EXCEPTIONS.  Patient verbalized understanding.  If patient was having surgery on a lower extremity, then the patient was instructed that toenail polish on both extremities must be removed for surgery.  NO EXCEPTIONS. Patient verbalized understanding.

## 2022-09-07 NOTE — PROGRESS NOTES
Gynecologic Preoperative Exam Note        Subjective   Ondina Loo is a 52 y.o. year old  who is scheduled for total laparoscopichysterectomy with right salping-oopherecomy at [facility] on 2022 at 07:15.  Pre Admission testing has been scheduled for 2022 at [facility]. Her pre operative diagnosis is Intramural leiomyoma of uterus and RLQ pain . She does not need to see her PCP for preop clearance for this surgery. Patient's last menstrual period was 2021.. Her birth control method is vasectomy.  Her BMI is Body mass index is 29.71 kg/m²..      She has reviewed the informational pamphlet on 2022.    She understands the risks of bleeding, infection, possible damage to other organ systems, including but not limited to the gastrointestinal tract and genitourinary tract.  She also understands the specific risks listed in the preop information (video, pamphlets, etc.).    She has reviewed and signed the preop consent form.    She has been instructed to have a light dinner the night before surgery, then nothing to eat or drink after midnight.  The day of surgery do not chew gum or smoke.  Remove all jewelry, nail polish, contact lenses prior to coming to the hospital.  Do not bring valuables or large sums of money with you. Patient was instructed on what time to arrive and where to check in, maps were given.  She was instructed that she will meet an Anesthesiologist and that an IV will be started to provide fluids and sedation.  The total time of procedure was discussed.  She was instructed that she will need a .      Allergies   Allergen Reactions   • Zofran [Ondansetron Hcl] Other (See Comments)     Severely constipated     She has confirmed that she is not allergic to Latex.     She is on the following medications. These were reviewed with the patient today and instructed on which medications are ok to take with a sip of water prior to the surgery.      Current  Outpatient Medications:   •  naratriptan (AMERGE) 1 MG tablet, Take 1 mg by mouth 1 (One) Time As Needed for Migraine., Disp: , Rfl:   •  SUMAtriptan (IMITREX) 100 MG tablet, Take 100 mg by mouth 1 (One) Time As Needed for Migraine., Disp: , Rfl:   •  tiZANidine (ZANAFLEX) 2 MG tablet, Take 1 mg by mouth Every Night., Disp: , Rfl: 0  •  topiramate (TOPAMAX) 100 MG tablet, Take 100 mg by mouth Every Night., Disp: , Rfl:      Past Medical History:   Diagnosis Date   • Constipation     due to loop in bowel-HISTORY OF NO CURRENT ISSUES   • GERD (gastroesophageal reflux disease)    • Migraines    • Ovarian cyst      Past Surgical History:   Procedure Laterality Date   • COLONOSCOPY     • BREAST CAPSULOTOMY, IMPLANT REVISION Bilateral 2018    Procedure: BILATERAL CAPSULECTOMY AND BREAST IMPLANT REMOVAL;  Surgeon: Eileen Lares MD;  Location:  [facility];  Service: Plastics   • INCISION AND DRAINAGE LEG Left 10/15/2019    Procedure: I&D LEFT ANKLE PUNCTURE WOUND;  Surgeon: Jennifer Vidal MD;  Location:  [facility];  Service: Orthopedics   • INCISION AND DRAINAGE LEG Left 2019    Procedure: INCISION AND DRAINAGE LEFT ANKLE;  Surgeon: Jennifer Vidal MD;  Location:  [facility];  Service: Orthopedics   • AUGMENTATION MAMMAPLASTY Bilateral ~     IMPLANTS EXPLANTED ~    • BREAST AUGMENTATION     • OOPHORECTOMY Left      OB History    Para Term  AB Living   2 1 1 0 1 1   SAB IAB Ectopic Molar Multiple Live Births   1 0 0 0 0 1      # Outcome Date GA Lbr Nikita/2nd Weight Sex Delivery Anes PTL Lv   2 Term      Vag-Spont   THANH   1 SAB              Social History     Tobacco Use   Smoking Status Never Smoker   Smokeless Tobacco Never Used     Social History     Substance and Sexual Activity   Alcohol Use Yes   • Alcohol/week: 5.0 standard drinks   • Types: 5 Standard drinks or equivalent per week    Comment: social     Social History     Substance and Sexual Activity   Drug Use No         Review  of Systems        Objective    Vitals:    09/07/22 0947   BP: 126/82         Physical Exam  Vitals and nursing note reviewed. Exam conducted with a chaperone present.   Constitutional:       Appearance: She is well-developed.   HENT:      Head: Normocephalic and atraumatic.   Cardiovascular:      Rate and Rhythm: Normal rate and regular rhythm.   Pulmonary:      Effort: Pulmonary effort is normal.      Breath sounds: Normal breath sounds.   Abdominal:      General: Bowel sounds are normal.      Palpations: Abdomen is soft. Abdomen is not rigid.   Musculoskeletal:      Cervical back: Normal range of motion. No muscular tenderness.   Skin:     General: Skin is warm and dry.   Neurological:      Mental Status: She is alert and oriented to person, place, and time.   Psychiatric:         Behavior: Behavior normal.         Assessment   Problem List Items Addressed This Visit    None     Visit Diagnoses     Abnormal uterine bleeding (AUB)    -  Primary    Pelvic pain        Intramural leiomyoma of uterus        Cyst of ovary, unspecified laterality                       Plan   1. Plan is for TLH/RSO.  2. If severe adhesions she would rather not go through with the surgery.  3. Risks of surgery were reviewed with the patient including risks of bleeding, infection, damage to other organ systems including, but not limited to GI and  tracts (bowel, bladder, blood vessels, nerves) risks of Anesthesia, as well as the risk the surgery will not produce the desired results, possible need for additional surgery, death, risk of uterine perforation.  Info on ERT risk/benefit given  4. PAT Scheduled    5. Amaury has been obtained and reviewed   6. Pain Medication Consent Form has been signed.  A review regarding proper medication administration, impact on driving and working while medicated, the safety of use in pregnancy, the potential for overdose and the proper disposal and storage of controlled medications has been done with the  patient.          Tash Amador MD  9/7/2022

## 2022-09-08 ENCOUNTER — ANESTHESIA EVENT (OUTPATIENT)
Dept: PERIOP | Facility: HOSPITAL | Age: 52
End: 2022-09-08

## 2022-09-08 RX ORDER — FAMOTIDINE 10 MG/ML
20 INJECTION, SOLUTION INTRAVENOUS ONCE
Status: CANCELLED | OUTPATIENT
Start: 2022-09-08 | End: 2022-09-08

## 2022-09-08 RX ORDER — SODIUM CHLORIDE 0.9 % (FLUSH) 0.9 %
10 SYRINGE (ML) INJECTION EVERY 12 HOURS SCHEDULED
Status: CANCELLED | OUTPATIENT
Start: 2022-09-08

## 2022-09-08 RX ORDER — SODIUM CHLORIDE 0.9 % (FLUSH) 0.9 %
10 SYRINGE (ML) INJECTION AS NEEDED
Status: CANCELLED | OUTPATIENT
Start: 2022-09-08

## 2022-09-09 ENCOUNTER — HOSPITAL ENCOUNTER (OUTPATIENT)
Facility: HOSPITAL | Age: 52
Discharge: HOME | End: 2022-09-10
Attending: OBSTETRICS & GYNECOLOGY | Admitting: OBSTETRICS & GYNECOLOGY

## 2022-09-09 ENCOUNTER — ANESTHESIA (OUTPATIENT)
Dept: PERIOP | Facility: HOSPITAL | Age: 52
End: 2022-09-09

## 2022-09-09 DIAGNOSIS — R10.2 PELVIC PAIN: ICD-10-CM

## 2022-09-09 DIAGNOSIS — D25.1 INTRAMURAL LEIOMYOMA OF UTERUS: ICD-10-CM

## 2022-09-09 DIAGNOSIS — Z90.710 STATUS POST LAPAROSCOPIC HYSTERECTOMY: Primary | ICD-10-CM

## 2022-09-09 DIAGNOSIS — D21.9 FIBROIDS: ICD-10-CM

## 2022-09-09 LAB
ANION GAP SERPL CALCULATED.3IONS-SCNC: 13 MMOL/L (ref 5–15)
B-HCG UR QL: NEGATIVE
BUN SERPL-MCNC: 11 MG/DL (ref 6–20)
BUN/CREAT SERPL: 12.8 (ref 7–25)
CALCIUM SPEC-SCNC: 9.2 MG/DL (ref 8.6–10.5)
CHLORIDE SERPL-SCNC: 104 MMOL/L (ref 98–107)
CO2 SERPL-SCNC: 22 MMOL/L (ref 22–29)
CREAT SERPL-MCNC: 0.86 MG/DL (ref 0.57–1)
DEPRECATED RDW RBC AUTO: 44.9 FL (ref 37–54)
EGFRCR SERPLBLD CKD-EPI 2021: 82.4 ML/MIN/1.73
ERYTHROCYTE [DISTWIDTH] IN BLOOD BY AUTOMATED COUNT: 13.2 % (ref 12.3–15.4)
EXPIRATION DATE: NORMAL
GLUCOSE SERPL-MCNC: 161 MG/DL (ref 65–99)
HCT VFR BLD AUTO: 42.1 % (ref 34–46.6)
HGB BLD-MCNC: 14.2 G/DL (ref 12–15.9)
INTERNAL NEGATIVE CONTROL: NORMAL
INTERNAL POSITIVE CONTROL: NORMAL
Lab: NORMAL
MCH RBC QN AUTO: 31.1 PG (ref 26.6–33)
MCHC RBC AUTO-ENTMCNC: 33.7 G/DL (ref 31.5–35.7)
MCV RBC AUTO: 92.1 FL (ref 79–97)
PLATELET # BLD AUTO: 321 10*3/MM3 (ref 140–450)
PMV BLD AUTO: 10 FL (ref 6–12)
POTASSIUM SERPL-SCNC: 3.9 MMOL/L (ref 3.5–5.2)
RBC # BLD AUTO: 4.57 10*6/MM3 (ref 3.77–5.28)
SODIUM SERPL-SCNC: 139 MMOL/L (ref 136–145)
WBC NRBC COR # BLD: 11.92 10*3/MM3 (ref 3.4–10.8)

## 2022-09-09 PROCEDURE — 58571 TLH W/T/O 250 G OR LESS: CPT | Performed by: OBSTETRICS & GYNECOLOGY

## 2022-09-09 PROCEDURE — S0260 H&P FOR SURGERY: HCPCS | Performed by: OBSTETRICS & GYNECOLOGY

## 2022-09-09 PROCEDURE — 25010000002 HYDROMORPHONE 1 MG/ML SOLUTION

## 2022-09-09 PROCEDURE — 0 LIDOCAINE 1 % SOLUTION: Performed by: NURSE ANESTHETIST, CERTIFIED REGISTERED

## 2022-09-09 PROCEDURE — 25010000002 KETOROLAC TROMETHAMINE PER 15 MG: Performed by: NURSE ANESTHETIST, CERTIFIED REGISTERED

## 2022-09-09 PROCEDURE — 25010000002 KETOROLAC TROMETHAMINE PER 15 MG: Performed by: OBSTETRICS & GYNECOLOGY

## 2022-09-09 PROCEDURE — 25010000002 CEFAZOLIN IN DEXTROSE 2-4 GM/100ML-% SOLUTION: Performed by: OBSTETRICS & GYNECOLOGY

## 2022-09-09 PROCEDURE — 63710000001 PROMETHAZINE PER 12.5 MG: Performed by: OBSTETRICS & GYNECOLOGY

## 2022-09-09 PROCEDURE — 81025 URINE PREGNANCY TEST: CPT | Performed by: OBSTETRICS & GYNECOLOGY

## 2022-09-09 PROCEDURE — 25010000002 PROPOFOL 10 MG/ML EMULSION: Performed by: NURSE ANESTHETIST, CERTIFIED REGISTERED

## 2022-09-09 PROCEDURE — 80048 BASIC METABOLIC PNL TOTAL CA: CPT | Performed by: OBSTETRICS & GYNECOLOGY

## 2022-09-09 PROCEDURE — 25010000002 CEFOXITIN PER 1 G

## 2022-09-09 PROCEDURE — 25010000002 FENTANYL CITRATE (PF) 50 MCG/ML SOLUTION: Performed by: NURSE ANESTHETIST, CERTIFIED REGISTERED

## 2022-09-09 PROCEDURE — 85027 COMPLETE CBC AUTOMATED: CPT | Performed by: OBSTETRICS & GYNECOLOGY

## 2022-09-09 PROCEDURE — 88307 TISSUE EXAM BY PATHOLOGIST: CPT | Performed by: OBSTETRICS & GYNECOLOGY

## 2022-09-09 PROCEDURE — 25010000002 DEXAMETHASONE PER 1 MG: Performed by: NURSE ANESTHETIST, CERTIFIED REGISTERED

## 2022-09-09 PROCEDURE — 25010000002 FENTANYL CITRATE (PF) 50 MCG/ML SOLUTION

## 2022-09-09 PROCEDURE — C1765 ADHESION BARRIER: HCPCS | Performed by: OBSTETRICS & GYNECOLOGY

## 2022-09-09 DEVICE — ABSORBABLE ADHESION BARRIER
Type: IMPLANTABLE DEVICE | Site: ABDOMEN | Status: FUNCTIONAL
Brand: GYNECARE INTERCEED

## 2022-09-09 RX ORDER — FENTANYL CITRATE 50 UG/ML
INJECTION, SOLUTION INTRAMUSCULAR; INTRAVENOUS
Status: COMPLETED
Start: 2022-09-09 | End: 2022-09-09

## 2022-09-09 RX ORDER — ESTRADIOL 0.05 MG/D
1 FILM, EXTENDED RELEASE TRANSDERMAL 2 TIMES WEEKLY
Status: DISCONTINUED | OUTPATIENT
Start: 2022-09-12 | End: 2022-09-10

## 2022-09-09 RX ORDER — FENTANYL CITRATE 50 UG/ML
INJECTION, SOLUTION INTRAMUSCULAR; INTRAVENOUS AS NEEDED
Status: DISCONTINUED | OUTPATIENT
Start: 2022-09-09 | End: 2022-09-09 | Stop reason: SURG

## 2022-09-09 RX ORDER — PROMETHAZINE HYDROCHLORIDE 12.5 MG/1
12.5 TABLET ORAL EVERY 6 HOURS PRN
Status: DISCONTINUED | OUTPATIENT
Start: 2022-09-09 | End: 2022-09-10 | Stop reason: HOSPADM

## 2022-09-09 RX ORDER — CHOLECALCIFEROL (VITAMIN D3) 125 MCG
5 CAPSULE ORAL NIGHTLY
COMMUNITY

## 2022-09-09 RX ORDER — SODIUM CHLORIDE, SODIUM LACTATE, POTASSIUM CHLORIDE, CALCIUM CHLORIDE 600; 310; 30; 20 MG/100ML; MG/100ML; MG/100ML; MG/100ML
9 INJECTION, SOLUTION INTRAVENOUS CONTINUOUS
Status: DISCONTINUED | OUTPATIENT
Start: 2022-09-09 | End: 2022-09-09

## 2022-09-09 RX ORDER — LIDOCAINE HYDROCHLORIDE 10 MG/ML
INJECTION, SOLUTION INFILTRATION; PERINEURAL AS NEEDED
Status: DISCONTINUED | OUTPATIENT
Start: 2022-09-09 | End: 2022-09-09 | Stop reason: SURG

## 2022-09-09 RX ORDER — FAMOTIDINE 20 MG/1
20 TABLET, FILM COATED ORAL ONCE
Status: COMPLETED | OUTPATIENT
Start: 2022-09-09 | End: 2022-09-09

## 2022-09-09 RX ORDER — PROMETHAZINE HYDROCHLORIDE 12.5 MG/1
12.5 SUPPOSITORY RECTAL EVERY 6 HOURS PRN
Status: DISCONTINUED | OUTPATIENT
Start: 2022-09-09 | End: 2022-09-10 | Stop reason: HOSPADM

## 2022-09-09 RX ORDER — SCOLOPAMINE TRANSDERMAL SYSTEM 1 MG/1
1 PATCH, EXTENDED RELEASE TRANSDERMAL ONCE
Status: DISCONTINUED | OUTPATIENT
Start: 2022-09-09 | End: 2022-09-09

## 2022-09-09 RX ORDER — ACETAMINOPHEN 325 MG/1
650 TABLET ORAL EVERY 4 HOURS PRN
Status: DISCONTINUED | OUTPATIENT
Start: 2022-09-09 | End: 2022-09-10 | Stop reason: HOSPADM

## 2022-09-09 RX ORDER — MELOXICAM 7.5 MG/1
7.5 TABLET ORAL DAILY
Status: DISCONTINUED | OUTPATIENT
Start: 2022-09-10 | End: 2022-09-10 | Stop reason: HOSPADM

## 2022-09-09 RX ORDER — TEMAZEPAM 15 MG/1
15 CAPSULE ORAL NIGHTLY PRN
Status: DISCONTINUED | OUTPATIENT
Start: 2022-09-09 | End: 2022-09-10 | Stop reason: HOSPADM

## 2022-09-09 RX ORDER — SCOLOPAMINE TRANSDERMAL SYSTEM 1 MG/1
1 PATCH, EXTENDED RELEASE TRANSDERMAL CONTINUOUS
Status: DISCONTINUED | OUTPATIENT
Start: 2022-09-09 | End: 2022-09-09 | Stop reason: SDUPTHER

## 2022-09-09 RX ORDER — MIDAZOLAM HYDROCHLORIDE 1 MG/ML
1 INJECTION INTRAMUSCULAR; INTRAVENOUS
Status: DISCONTINUED | OUTPATIENT
Start: 2022-09-09 | End: 2022-09-09 | Stop reason: HOSPADM

## 2022-09-09 RX ORDER — ROCURONIUM BROMIDE 10 MG/ML
INJECTION, SOLUTION INTRAVENOUS AS NEEDED
Status: DISCONTINUED | OUTPATIENT
Start: 2022-09-09 | End: 2022-09-09 | Stop reason: SURG

## 2022-09-09 RX ORDER — ACETAMINOPHEN 650 MG/1
650 SUPPOSITORY RECTAL EVERY 6 HOURS
Status: DISCONTINUED | OUTPATIENT
Start: 2022-09-09 | End: 2022-09-09 | Stop reason: ALTCHOICE

## 2022-09-09 RX ORDER — HYDROMORPHONE HYDROCHLORIDE 1 MG/ML
0.5 INJECTION, SOLUTION INTRAMUSCULAR; INTRAVENOUS; SUBCUTANEOUS
Status: DISCONTINUED | OUTPATIENT
Start: 2022-09-09 | End: 2022-09-09 | Stop reason: HOSPADM

## 2022-09-09 RX ORDER — DEXAMETHASONE SODIUM PHOSPHATE 4 MG/ML
INJECTION, SOLUTION INTRA-ARTICULAR; INTRALESIONAL; INTRAMUSCULAR; INTRAVENOUS; SOFT TISSUE AS NEEDED
Status: DISCONTINUED | OUTPATIENT
Start: 2022-09-09 | End: 2022-09-09 | Stop reason: SURG

## 2022-09-09 RX ORDER — GABAPENTIN 100 MG/1
100 CAPSULE ORAL 3 TIMES DAILY
Status: DISCONTINUED | OUTPATIENT
Start: 2022-09-09 | End: 2022-09-10 | Stop reason: HOSPADM

## 2022-09-09 RX ORDER — CEFAZOLIN SODIUM 2 G/100ML
2 INJECTION, SOLUTION INTRAVENOUS EVERY 8 HOURS
Status: COMPLETED | OUTPATIENT
Start: 2022-09-09 | End: 2022-09-09

## 2022-09-09 RX ORDER — HYDROCODONE BITARTRATE AND ACETAMINOPHEN 7.5; 325 MG/1; MG/1
2 TABLET ORAL EVERY 4 HOURS PRN
Status: DISCONTINUED | OUTPATIENT
Start: 2022-09-09 | End: 2022-09-10 | Stop reason: HOSPADM

## 2022-09-09 RX ORDER — KETOROLAC TROMETHAMINE 15 MG/ML
15 INJECTION, SOLUTION INTRAMUSCULAR; INTRAVENOUS EVERY 6 HOURS
Status: COMPLETED | OUTPATIENT
Start: 2022-09-09 | End: 2022-09-09

## 2022-09-09 RX ORDER — KETOROLAC TROMETHAMINE 30 MG/ML
INJECTION, SOLUTION INTRAMUSCULAR; INTRAVENOUS AS NEEDED
Status: DISCONTINUED | OUTPATIENT
Start: 2022-09-09 | End: 2022-09-09 | Stop reason: SURG

## 2022-09-09 RX ORDER — PROPOFOL 10 MG/ML
VIAL (ML) INTRAVENOUS AS NEEDED
Status: DISCONTINUED | OUTPATIENT
Start: 2022-09-09 | End: 2022-09-09 | Stop reason: SURG

## 2022-09-09 RX ORDER — MAGNESIUM HYDROXIDE 1200 MG/15ML
LIQUID ORAL AS NEEDED
Status: DISCONTINUED | OUTPATIENT
Start: 2022-09-09 | End: 2022-09-09 | Stop reason: HOSPADM

## 2022-09-09 RX ORDER — ACETAMINOPHEN 325 MG/1
650 TABLET ORAL EVERY 6 HOURS
Status: DISCONTINUED | OUTPATIENT
Start: 2022-09-09 | End: 2022-09-10 | Stop reason: HOSPADM

## 2022-09-09 RX ORDER — SODIUM CHLORIDE 9 MG/ML
125 INJECTION, SOLUTION INTRAVENOUS CONTINUOUS
Status: DISCONTINUED | OUTPATIENT
Start: 2022-09-09 | End: 2022-09-10 | Stop reason: HOSPADM

## 2022-09-09 RX ORDER — ACETAMINOPHEN 160 MG/5ML
650 SOLUTION ORAL EVERY 6 HOURS SCHEDULED
Status: DISCONTINUED | OUTPATIENT
Start: 2022-09-09 | End: 2022-09-09 | Stop reason: SDUPTHER

## 2022-09-09 RX ORDER — DROPERIDOL 2.5 MG/ML
0.62 INJECTION, SOLUTION INTRAMUSCULAR; INTRAVENOUS ONCE AS NEEDED
Status: DISCONTINUED | OUTPATIENT
Start: 2022-09-09 | End: 2022-09-09 | Stop reason: HOSPADM

## 2022-09-09 RX ORDER — GLYCOPYRROLATE 0.2 MG/ML
INJECTION INTRAMUSCULAR; INTRAVENOUS AS NEEDED
Status: DISCONTINUED | OUTPATIENT
Start: 2022-09-09 | End: 2022-09-09 | Stop reason: SURG

## 2022-09-09 RX ORDER — LIDOCAINE HYDROCHLORIDE 10 MG/ML
0.5 INJECTION, SOLUTION EPIDURAL; INFILTRATION; INTRACAUDAL; PERINEURAL ONCE AS NEEDED
Status: COMPLETED | OUTPATIENT
Start: 2022-09-09 | End: 2022-09-09

## 2022-09-09 RX ORDER — BUPIVACAINE HYDROCHLORIDE AND EPINEPHRINE 5; 5 MG/ML; UG/ML
INJECTION, SOLUTION PERINEURAL AS NEEDED
Status: DISCONTINUED | OUTPATIENT
Start: 2022-09-09 | End: 2022-09-09 | Stop reason: HOSPADM

## 2022-09-09 RX ORDER — ACETAMINOPHEN 500 MG
1000 TABLET ORAL ONCE
Status: COMPLETED | OUTPATIENT
Start: 2022-09-09 | End: 2022-09-09

## 2022-09-09 RX ORDER — GABAPENTIN 300 MG/1
600 CAPSULE ORAL ONCE
Status: COMPLETED | OUTPATIENT
Start: 2022-09-09 | End: 2022-09-09

## 2022-09-09 RX ORDER — SIMETHICONE 80 MG
80 TABLET,CHEWABLE ORAL 4 TIMES DAILY PRN
Status: DISCONTINUED | OUTPATIENT
Start: 2022-09-09 | End: 2022-09-10 | Stop reason: HOSPADM

## 2022-09-09 RX ORDER — SODIUM CHLORIDE, SODIUM LACTATE, POTASSIUM CHLORIDE, CALCIUM CHLORIDE 600; 310; 30; 20 MG/100ML; MG/100ML; MG/100ML; MG/100ML
INJECTION, SOLUTION INTRAVENOUS CONTINUOUS PRN
Status: DISCONTINUED | OUTPATIENT
Start: 2022-09-09 | End: 2022-09-09 | Stop reason: SURG

## 2022-09-09 RX ORDER — DOCUSATE SODIUM 100 MG/1
100 CAPSULE, LIQUID FILLED ORAL 2 TIMES DAILY PRN
Status: DISCONTINUED | OUTPATIENT
Start: 2022-09-09 | End: 2022-09-10 | Stop reason: HOSPADM

## 2022-09-09 RX ORDER — FENTANYL CITRATE 50 UG/ML
50 INJECTION, SOLUTION INTRAMUSCULAR; INTRAVENOUS
Status: DISCONTINUED | OUTPATIENT
Start: 2022-09-09 | End: 2022-09-09 | Stop reason: HOSPADM

## 2022-09-09 RX ADMIN — FENTANYL CITRATE 50 MCG: 50 INJECTION, SOLUTION INTRAMUSCULAR; INTRAVENOUS at 07:53

## 2022-09-09 RX ADMIN — HYDROMORPHONE HYDROCHLORIDE 0.5 MG: 1 INJECTION, SOLUTION INTRAMUSCULAR; INTRAVENOUS; SUBCUTANEOUS at 10:20

## 2022-09-09 RX ADMIN — ROCURONIUM BROMIDE 10 MG: 50 INJECTION, SOLUTION INTRAVENOUS at 09:06

## 2022-09-09 RX ADMIN — SODIUM CHLORIDE, POTASSIUM CHLORIDE, SODIUM LACTATE AND CALCIUM CHLORIDE 9 ML/HR: 600; 310; 30; 20 INJECTION, SOLUTION INTRAVENOUS at 07:05

## 2022-09-09 RX ADMIN — HYDROMORPHONE HYDROCHLORIDE 0.5 MG: 1 INJECTION, SOLUTION INTRAMUSCULAR; INTRAVENOUS; SUBCUTANEOUS at 11:03

## 2022-09-09 RX ADMIN — FENTANYL CITRATE 50 MCG: 50 INJECTION, SOLUTION INTRAMUSCULAR; INTRAVENOUS at 10:14

## 2022-09-09 RX ADMIN — GABAPENTIN 600 MG: 300 CAPSULE ORAL at 07:12

## 2022-09-09 RX ADMIN — PROPOFOL 200 MG: 10 INJECTION, EMULSION INTRAVENOUS at 07:54

## 2022-09-09 RX ADMIN — Medication 2 G: at 08:03

## 2022-09-09 RX ADMIN — HYDROCODONE BITARTRATE AND ACETAMINOPHEN 1 TABLET: 7.5; 325 TABLET ORAL at 17:43

## 2022-09-09 RX ADMIN — ACETAMINOPHEN 650 MG: 325 TABLET, FILM COATED ORAL at 15:19

## 2022-09-09 RX ADMIN — LIDOCAINE HYDROCHLORIDE 0.5 ML: 10 INJECTION, SOLUTION EPIDURAL; INFILTRATION; INTRACAUDAL; PERINEURAL at 07:05

## 2022-09-09 RX ADMIN — FENTANYL CITRATE 50 MCG: 50 INJECTION INTRAMUSCULAR; INTRAVENOUS at 11:36

## 2022-09-09 RX ADMIN — ROCURONIUM BROMIDE 50 MG: 50 INJECTION, SOLUTION INTRAVENOUS at 07:54

## 2022-09-09 RX ADMIN — HYDROCODONE BITARTRATE AND ACETAMINOPHEN 2 TABLET: 7.5; 325 TABLET ORAL at 23:14

## 2022-09-09 RX ADMIN — CEFAZOLIN SODIUM 2 G: 2 INJECTION, SOLUTION INTRAVENOUS at 23:17

## 2022-09-09 RX ADMIN — SODIUM CHLORIDE, POTASSIUM CHLORIDE, SODIUM LACTATE AND CALCIUM CHLORIDE: 600; 310; 30; 20 INJECTION, SOLUTION INTRAVENOUS at 07:53

## 2022-09-09 RX ADMIN — FENTANYL CITRATE 50 MCG: 50 INJECTION, SOLUTION INTRAMUSCULAR; INTRAVENOUS at 11:36

## 2022-09-09 RX ADMIN — FAMOTIDINE 20 MG: 20 TABLET ORAL at 07:12

## 2022-09-09 RX ADMIN — HYDROMORPHONE HYDROCHLORIDE 0.5 MG: 1 INJECTION, SOLUTION INTRAMUSCULAR; INTRAVENOUS; SUBCUTANEOUS at 10:34

## 2022-09-09 RX ADMIN — SIMETHICONE 80 MG: 80 TABLET, CHEWABLE ORAL at 23:14

## 2022-09-09 RX ADMIN — GLYCOPYRROLATE 0.2 MG: 0.2 INJECTION INTRAMUSCULAR; INTRAVENOUS at 08:37

## 2022-09-09 RX ADMIN — KETOROLAC TROMETHAMINE 30 MG: 30 INJECTION, SOLUTION INTRAMUSCULAR; INTRAVENOUS at 09:40

## 2022-09-09 RX ADMIN — GABAPENTIN 100 MG: 100 CAPSULE ORAL at 15:20

## 2022-09-09 RX ADMIN — ACETAMINOPHEN 1000 MG: 500 TABLET, FILM COATED ORAL at 07:12

## 2022-09-09 RX ADMIN — PROPOFOL 25 MCG/KG/MIN: 10 INJECTION, EMULSION INTRAVENOUS at 08:11

## 2022-09-09 RX ADMIN — ROCURONIUM BROMIDE 10 MG: 50 INJECTION, SOLUTION INTRAVENOUS at 08:29

## 2022-09-09 RX ADMIN — CEFAZOLIN SODIUM 2 G: 2 INJECTION, SOLUTION INTRAVENOUS at 15:19

## 2022-09-09 RX ADMIN — SODIUM CHLORIDE 125 ML/HR: 9 INJECTION, SOLUTION INTRAVENOUS at 12:30

## 2022-09-09 RX ADMIN — KETOROLAC TROMETHAMINE 15 MG: 15 INJECTION, SOLUTION INTRAMUSCULAR; INTRAVENOUS at 15:20

## 2022-09-09 RX ADMIN — PROMETHAZINE HYDROCHLORIDE 12.5 MG: 12.5 TABLET ORAL at 12:26

## 2022-09-09 RX ADMIN — SIMETHICONE 80 MG: 80 TABLET, CHEWABLE ORAL at 15:20

## 2022-09-09 RX ADMIN — SUGAMMADEX 200 MG: 100 INJECTION, SOLUTION INTRAVENOUS at 09:38

## 2022-09-09 RX ADMIN — FENTANYL CITRATE 50 MCG: 50 INJECTION INTRAMUSCULAR; INTRAVENOUS at 10:14

## 2022-09-09 RX ADMIN — LIDOCAINE HYDROCHLORIDE 50 MG: 10 INJECTION, SOLUTION INFILTRATION; PERINEURAL at 07:54

## 2022-09-09 RX ADMIN — KETOROLAC TROMETHAMINE 15 MG: 15 INJECTION, SOLUTION INTRAMUSCULAR; INTRAVENOUS at 20:38

## 2022-09-09 RX ADMIN — SCOPALAMINE 1 PATCH: 1 PATCH, EXTENDED RELEASE TRANSDERMAL at 07:13

## 2022-09-09 RX ADMIN — HYDROCODONE BITARTRATE AND ACETAMINOPHEN 1 TABLET: 7.5; 325 TABLET ORAL at 12:43

## 2022-09-09 RX ADMIN — GABAPENTIN 100 MG: 100 CAPSULE ORAL at 20:38

## 2022-09-09 RX ADMIN — DEXAMETHASONE SODIUM PHOSPHATE 8 MG: 4 INJECTION, SOLUTION INTRA-ARTICULAR; INTRALESIONAL; INTRAMUSCULAR; INTRAVENOUS; SOFT TISSUE at 07:53

## 2022-09-09 RX ADMIN — FENTANYL CITRATE 50 MCG: 50 INJECTION, SOLUTION INTRAMUSCULAR; INTRAVENOUS at 07:54

## 2022-09-09 NOTE — H&P
Pre-Op H&P  Ondina Loo  [id number]  1970    Chief complaint: pelvic pain    HPI:    Patient is a 52 y.o.female who presents with a history of pelvic pain and abnormal uterine bleeding in the presence of intramural leiomyoma. She presents to the operating room today for surgical management with a total laparoscopic hysterectomy, right salpingo oophorectomy, possible left.     Review of Systems:  General ROS: negative for chills, fever or skin lesions;  No changes since last office visit.  Neg for recent sick exposure  Cardiovascular ROS: no chest pain or dyspnea on exertion  Respiratory ROS: no cough, shortness of breath, or wheezing    Allergies:   Allergies   Allergen Reactions   • Zofran [Ondansetron Hcl] Other (See Comments)     Severely constipated       Home Meds:    No current facility-administered medications on file prior to encounter.     Current Outpatient Medications on File Prior to Encounter   Medication Sig Dispense Refill   • tiZANidine (ZANAFLEX) 2 MG tablet Take 1 mg by mouth Every Night.  0   • topiramate (TOPAMAX) 100 MG tablet Take 100 mg by mouth Every Night.     • naratriptan (AMERGE) 1 MG tablet Take 1 mg by mouth 1 (One) Time As Needed for Migraine.     • SUMAtriptan (IMITREX) 100 MG tablet Take 100 mg by mouth 1 (One) Time As Needed for Migraine.         PMH:   Past Medical History:   Diagnosis Date   • Constipation     due to loop in bowel-HISTORY OF NO CURRENT ISSUES   • GERD (gastroesophageal reflux disease)    • Migraines    • Ovarian cyst      PSH:    Past Surgical History:   Procedure Laterality Date   • AUGMENTATION MAMMAPLASTY Bilateral ~ 2012    IMPLANTS EXPLANTED ~ 2018   • BREAST CAPSULOTOMY, IMPLANT REVISION Bilateral 05/08/2018    Procedure: BILATERAL CAPSULECTOMY AND BREAST IMPLANT REMOVAL;  Surgeon: Eileen Lares MD;  Location: [facility];  Service: Plastics   • COLONOSCOPY  2017   • INCISION AND DRAINAGE LEG Left 10/15/2019    Procedure: I&D LEFT ANKLE  "PUNCTURE WOUND;  Surgeon: Jennifer Vidal MD;  Location:  [facility];  Service: Orthopedics   • INCISION AND DRAINAGE LEG Left 11/14/2019    Procedure: INCISION AND DRAINAGE LEFT ANKLE;  Surgeon: Jennifer Vidal MD;  Location:  [facility];  Service: Orthopedics   • OOPHORECTOMY Left        Immunization History:  Influenza: 2021  Pneumococcal: denies  Tetanus: <10 years     Social History:   Tobacco:   Social History     Tobacco Use   Smoking Status Never Smoker   Smokeless Tobacco Never Used      Alcohol:     Social History     Substance and Sexual Activity   Alcohol Use Yes   • Alcohol/week: 5.0 standard drinks   • Types: 5 Standard drinks or equivalent per week    Comment: social       Vitals:           /88 (BP Location: Right arm, Patient Position: Lying)   Pulse 94   Temp 98.2 °F (36.8 °C) (Temporal)   Resp 16   Ht 162.6 cm (64\")   Wt 79.8 kg (176 lb)   LMP 06/09/2021   SpO2 98%   BMI 30.21 kg/m²     Physical Exam:  General Appearance:    Alert, cooperative, no distress, appears stated age   Head:    Normocephalic, without obvious abnormality, atraumatic   Lungs:     Clear to auscultation bilaterally, respirations unlabored    Heart:   Regular rate and rhythm, S1 and S2 normal, no murmur, rub    or gallop    Abdomen:    Soft, nontender.  +bowel sounds   Breast Exam:    deferred   Genitalia:    deferred   Extremities:   Extremities normal, atraumatic, no cyanosis or edema   Skin:   Skin color, texture, turgor normal, no rashes or lesions   Neurologic:   Grossly intact   Results Review  LABS:  Lab Results   Component Value Date    WBC 6.34 09/07/2022    HGB 14.1 09/07/2022    HCT 42.3 09/07/2022    MCV 94.2 09/07/2022     09/07/2022    NEUTROABS 2.85 09/07/2022    GLUCOSE 109 (H) 09/07/2022    BUN 21 (H) 09/07/2022    CREATININE 0.80 09/07/2022    EGFRIFNONA 79 02/09/2021    EGFRIFAFRI 96 02/09/2021     09/07/2022    K 4.5 09/07/2022     09/07/2022    CO2 24.0 09/07/2022    CALCIUM " 9.8 09/07/2022    ALBUMIN 4.8 03/15/2022    AST 19 03/15/2022    ALT 14 03/15/2022    BILITOT 0.3 03/15/2022       RADIOLOGY:  No radiology results for the last 3 days     Cancer Staging (if applicable)  Cancer Patient: __ yes _x_no __unknown; If yes, clinical stage T:__ N:__M:__, stage group or __N/A    Impression: abnormal uterine bleeding    Plan: total laparoscopic hysterectomy, right salpingo oophorectomy possible left salpingo oophorectomy       Jair Alva PA-C   09/09/22   6:54 AM EDT

## 2022-09-09 NOTE — ADDENDUM NOTE
Addendum  created 09/09/22 1012 by Mario Oakes CRNA    Order list changed, Order sets accessed, Pharmacy for encounter modified

## 2022-09-09 NOTE — ANESTHESIA PREPROCEDURE EVALUATION
Anesthesia Evaluation     Patient summary reviewed and Nursing notes reviewed   NPO Solid Status: > 8 hours  NPO Liquid Status: > 2 hours           Airway   Mallampati: II  TM distance: >3 FB  Neck ROM: full  No difficulty expected  Dental      Pulmonary    (-) asthma, shortness of breath, recent URI, sleep apnea, not a smoker  Cardiovascular     (-) hypertension, past MI, dysrhythmias, angina      Neuro/Psych  (+) headaches,    (-) seizures, CVA  GI/Hepatic/Renal/Endo    (+)  GERD,    (-) no renal disease, diabetes, no thyroid disorder    Musculoskeletal     Abdominal    Substance History      OB/GYN      Comment: HCG NEG      Other            Phys Exam Other: Mac3   Grade 1 view 2019              Anesthesia Plan    ASA 2     general     (Propofol infusion as part of an anti PONV technique)  intravenous induction     Anesthetic plan, risks, benefits, and alternatives have been provided, discussed and informed consent has been obtained with: patient.    Plan discussed with CRNA.        CODE STATUS:

## 2022-09-09 NOTE — CASE MANAGEMENT/SOCIAL WORK
Discharge Planning Assessment  [facility]     Patient Name: Ondina Loo  MRN: [id number]  Today's Date: 9/9/2022    Admit Date: 9/9/2022     Discharge Needs Assessment     Row Name 09/09/22 1513       Living Environment    People in Home spouse    Current Living Arrangements home    Primary Care Provided by self    Provides Primary Care For no one    Family Caregiver if Needed spouse    Quality of Family Relationships helpful    Able to Return to Prior Arrangements yes       Resource/Environmental Concerns    Resource/Environmental Concerns none       Transition Planning    Patient/Family Anticipates Transition to home with family    Patient/Family Anticipated Services at Transition none    Transportation Anticipated family or friend will provide       Discharge Needs Assessment    Readmission Within the Last 30 Days no previous admission in last 30 days    Equipment Currently Used at Home none    Concerns to be Addressed discharge planning    Anticipated Changes Related to Illness none    Equipment Needed After Discharge none               Discharge Plan     Row Name 09/09/22 1511       Plan    Plan Comments Met with the patient for an assessment for discharge needs. She lives with her spouse in [place]. Her insurance coverage is [employer] insurance coverage. Her discharge plan is to return home at discharge and she is independent. Case management will follow.    Final Discharge Disposition Code 01 - home or self-care              Continued Care and Services - Admitted Since 9/9/2022    Coordination has not been started for this encounter.          Demographic Summary     Row Name 09/09/22 1513       General Information    Admission Type inpatient    Arrived From home    Referral Source patient    Reason for Consult discharge planning    Preferred Language English       Contact Information    Permission Granted to Share Info With     Contact Information Obtained for                 Functional Status     Row Name 09/09/22 1513       Functional Status    Usual Activity Tolerance moderate    Current Activity Tolerance moderate       Assessment of Health Literacy    How often do you have someone help you read hospital materials? Sometimes    How often do you have problems learning about your medical condition because of difficulty understanding written information? Occasionally    How often do you have a problem understanding what is told to you about your medical condition? Occasionally    How confident are you filling out medical forms by yourself? Quite a bit    Health Literacy Good       Functional Status, IADL    Medications independent    Meal Preparation independent    Housekeeping independent    Laundry independent    Shopping independent       Mental Status Summary    Recent Changes in Mental Status/Cognitive Functioning no changes               Psychosocial    No documentation.                Abuse/Neglect    No documentation.                Legal    No documentation.                Substance Abuse    No documentation.                Patient Forms    No documentation.                   SERINA Kemp

## 2022-09-09 NOTE — ANESTHESIA POSTPROCEDURE EVALUATION
Patient: Ondina Loo    Procedure Summary     Date: 09/09/22 [facility] / [facility]    Anesthesia Start: 0753 Anesthesia Stop: 0958    Procedure: TOTAL LAPAROSCOPIC HYSTERECTOMY RIGHT  SALPINGO OOPHORECTOMY (N/A Abdomen) Diagnosis:     Surgeons: Tash Amador MD Provider: Jerson Mckenzie MD    Anesthesia Type: general ASA Status: 2          Anesthesia Type: general    Vitals  Vitals Value Taken Time   /82 09/09/22 0955   Temp 97.1 °F (36.2 °C) 09/09/22 0953   Pulse 54 09/09/22 0958   Resp 16 09/09/22 0953   SpO2 99 % 09/09/22 0958   Vitals shown include unvalidated device data.        Post Anesthesia Care and Evaluation    Patient location during evaluation: PACU  Patient participation: complete - patient participated  Level of consciousness: awake  Pain management: adequate    Airway patency: patent  Anesthetic complications: No anesthetic complications  PONV Status: none  Cardiovascular status: hemodynamically stable and acceptable  Respiratory status: nonlabored ventilation, acceptable and nasal cannula  Hydration status: acceptable

## 2022-09-09 NOTE — OP NOTE
Operative Note:    Subjective     Date of Service:  09/09/22  Time of Service:  09:55 EDT    Attending:  Surgical asst:                     Surgeon(s) and Role:     * Tash Amador MD - Primary     * Divina Chan MD - Resident - Assisting     * Dayanara Rosenthal MD   was responsible for performing the following activities: Retraction, Suction and Irrigation and their skilled assistance was necessary for the success of this case.       Pre-operative diagnosis(es): Uterine leiomyoma  Ovarian cyst  Pelvic pain     Post-operative diagnosis(es): Same   Previous LSO   Procedure(s): Total Laparoscopic hysterectomy, right salpingo- oophorectomy  Cystoscopy   Antibiotics: cefoxitin (Mefoxin) ordered on call to OR     Anesthesia: Type: General  ASA:  II     Objective      Operative findings: Evidence of previous LSO   Specimens removed: Specimens     ID Source Type Tests Collected By Collected At Frozen?    A Uterus, Cervix, R Fallopian Tube, R Ovary Tissue · TISSUE PATHOLOGY EXAM   Tash Amador MD 9/9/22 0903     Description: uterus, right fallopian tube and right ovary    This specimen was not marked as sent.         Fluid Intake: 1000   Output: Documented Output  Est. Blood Loss 50 mL           Blood products used: No   Drains: [REMOVED] Urethral Catheter Silicone 16 Fr. (Removed)      Implant Information:    Complications: None   Intraoperative consult(s):    Condition: stable   Disposition: to PACU and then admit to  medical - surgical floor         Procedure Note:  Patient was taken to the operating room where she underwent general endotracheal anesthesia and was then prepped and draped in the usual sterile fashion.  A weighted vaginal speculum was placed in the posterior vaginal fornix and the internal os was identified after placing a single-tooth tenaculum on the anterior lip of the cervix.  The uterus was sounded and the correct size uterine manipulator and O ring was then placed and secured with a 0  Vicryl.  The intrauterine balloon and vaginal bleeding were then inflated and a Chavira catheter was placed.    At this point attention was turned to the laparoscopic portion of the case.  An incision was made in the umbilicus and a 12 mm trocar was placed through the fascia and peritoneum without difficulty.  Intra-abdominal placement was confirmed with low CO2 pressure and direct visualization.  The laparoscope was placed and the anterior abdomen and pelvis was inspected with the aforementioned findings.  At this point a 12 mm trocar was placed into the left lower quadrant under direct visualization followed by a 5 mm right lower quadrant trocar both placed with care to avoid injury to the inferior epigastric artery.  Both ureters were then visualized peristalsing in the broad ligaments or pelvic sidewalls.  At this point the right IP was elevated and with the Harmonic scalpel s transected  And carried to the past the round ligament and then ultimately to the uterine artery.  The uterine artery was skeletonized.  Bladder flap was taken down with care to avoid injury to the bladder and with use of an endo Kitner.  Once the bladder flap was adequately developed the uterine artery was secured on the right.  The left round ligament was then secured and carried to the uterine artery which was then secured with the Harmonic scalpel.  The bladder flap was fully developed.  At this point the Harmonic scalpel was used to enter the anterior vaginal wall at the anterior bevel of the O ring and then the vagina was circumferentially transected and the cervix, uterus, and tubes removed through the vagina.  The vaginal occluder was replaced and the vaginal cuff was closed with the Endo Stitch in a 2 layer fashion.  Good hemostasis was noted.  Cystoscopy was now performed after IV injection of methylene blue.  No evidence of any stitches or bladder injuries and dye could be seen spilling easily from both ureteral orifices.  After  cystoscopy attention was turned back to the intra-abdominal portion of the case.  All pedicles were inspected and noted be hemostatic and any areas of non-hemostasis were made hemostatic with Bovie electrocautery.  Interceed was placed over the vaginal cuff and all pedicles were inspected on low CO2 pressure.  The right lower quadrant trocar site was closed with a Willian-Lerner device and the remaining incisions with 3-0 Vicryl.  Steri-Strips and Band-Aids were placed over the incisions.  The patient went to recovery awake and stable.  All sponge, instrument, and needle counts were correct. The rios was removed and the bladder back filled with 120 cc of sterile water                Tash Amador MD  09/09/22  09:55 EDT

## 2022-09-10 VITALS
OXYGEN SATURATION: 94 % | BODY MASS INDEX: 30.05 KG/M2 | DIASTOLIC BLOOD PRESSURE: 71 MMHG | TEMPERATURE: 98.1 F | SYSTOLIC BLOOD PRESSURE: 115 MMHG | HEIGHT: 64 IN | WEIGHT: 176 LBS | HEART RATE: 67 BPM | RESPIRATION RATE: 16 BRPM

## 2022-09-10 LAB
BASOPHILS # BLD AUTO: 0.08 10*3/MM3 (ref 0–0.2)
BASOPHILS NFR BLD AUTO: 0.7 % (ref 0–1.5)
DEPRECATED RDW RBC AUTO: 46.5 FL (ref 37–54)
EOSINOPHIL # BLD AUTO: 0.07 10*3/MM3 (ref 0–0.4)
EOSINOPHIL NFR BLD AUTO: 0.6 % (ref 0.3–6.2)
ERYTHROCYTE [DISTWIDTH] IN BLOOD BY AUTOMATED COUNT: 13.7 % (ref 12.3–15.4)
HCT VFR BLD AUTO: 37.3 % (ref 34–46.6)
HGB BLD-MCNC: 12.5 G/DL (ref 12–15.9)
IMM GRANULOCYTES # BLD AUTO: 0.02 10*3/MM3 (ref 0–0.05)
IMM GRANULOCYTES NFR BLD AUTO: 0.2 % (ref 0–0.5)
LYMPHOCYTES # BLD AUTO: 4.4 10*3/MM3 (ref 0.7–3.1)
LYMPHOCYTES NFR BLD AUTO: 36 % (ref 19.6–45.3)
MCH RBC QN AUTO: 31.1 PG (ref 26.6–33)
MCHC RBC AUTO-ENTMCNC: 33.5 G/DL (ref 31.5–35.7)
MCV RBC AUTO: 92.8 FL (ref 79–97)
MONOCYTES # BLD AUTO: 0.96 10*3/MM3 (ref 0.1–0.9)
MONOCYTES NFR BLD AUTO: 7.9 % (ref 5–12)
NEUTROPHILS NFR BLD AUTO: 54.6 % (ref 42.7–76)
NEUTROPHILS NFR BLD AUTO: 6.69 10*3/MM3 (ref 1.7–7)
NRBC BLD AUTO-RTO: 0 /100 WBC (ref 0–0.2)
PLATELET # BLD AUTO: 291 10*3/MM3 (ref 140–450)
PMV BLD AUTO: 9.9 FL (ref 6–12)
RBC # BLD AUTO: 4.02 10*6/MM3 (ref 3.77–5.28)
WBC NRBC COR # BLD: 12.22 10*3/MM3 (ref 3.4–10.8)

## 2022-09-10 PROCEDURE — 85025 COMPLETE CBC W/AUTO DIFF WBC: CPT | Performed by: OBSTETRICS & GYNECOLOGY

## 2022-09-10 PROCEDURE — 99024 POSTOP FOLLOW-UP VISIT: CPT | Performed by: OBSTETRICS & GYNECOLOGY

## 2022-09-10 RX ORDER — ESTRADIOL 0.05 MG/D
1 FILM, EXTENDED RELEASE TRANSDERMAL 2 TIMES WEEKLY
Status: DISCONTINUED | OUTPATIENT
Start: 2022-09-10 | End: 2022-09-10 | Stop reason: HOSPADM

## 2022-09-10 RX ORDER — HYDROCODONE BITARTRATE AND ACETAMINOPHEN 7.5; 325 MG/1; MG/1
2 TABLET ORAL EVERY 4 HOURS PRN
Qty: 20 TABLET | Refills: 0 | Status: SHIPPED | OUTPATIENT
Start: 2022-09-10 | End: 2022-09-16

## 2022-09-10 RX ORDER — ESTRADIOL 0.05 MG/D
1 FILM, EXTENDED RELEASE TRANSDERMAL 2 TIMES WEEKLY
Status: DISCONTINUED | OUTPATIENT
Start: 2022-09-12 | End: 2022-09-10

## 2022-09-10 RX ORDER — ESTRADIOL 0.05 MG/D
1 FILM, EXTENDED RELEASE TRANSDERMAL 2 TIMES WEEKLY
Qty: 8 PATCH | Refills: 6 | Status: SHIPPED | OUTPATIENT
Start: 2022-09-12 | End: 2022-10-20

## 2022-09-10 RX ADMIN — GABAPENTIN 100 MG: 100 CAPSULE ORAL at 08:40

## 2022-09-10 RX ADMIN — HYDROCODONE BITARTRATE AND ACETAMINOPHEN 2 TABLET: 7.5; 325 TABLET ORAL at 13:03

## 2022-09-10 RX ADMIN — SIMETHICONE 80 MG: 80 TABLET, CHEWABLE ORAL at 04:46

## 2022-09-10 RX ADMIN — MELOXICAM 7.5 MG: 7.5 TABLET ORAL at 08:40

## 2022-09-10 RX ADMIN — ESTRADIOL 1 PATCH: 0.05 PATCH TRANSDERMAL at 12:51

## 2022-09-10 RX ADMIN — ACETAMINOPHEN 650 MG: 325 TABLET, FILM COATED ORAL at 10:24

## 2022-09-10 RX ADMIN — ACETAMINOPHEN 650 MG: 325 TABLET, FILM COATED ORAL at 04:46

## 2022-09-10 NOTE — PLAN OF CARE
Goal Outcome Evaluation:  Plan of Care Reviewed With: patient, spouse, son            3 lap sites - L and R were c/d/I w/ umbilical area dried drainage. Prns given for pain. Ambulation in room. Tolerating diet. D/c teaching explained to patient and family - demonstrated understanding. Pt has appropriate f/u. IV removed. D/c home w/ .

## 2022-09-10 NOTE — DISCHARGE SUMMARY
9/10/2022    Name:Ondina Loo   MR#:[id number]      GYN Progress Note       HD:1    Subjective   52 y.o.yo  POD#1. Feels well. Tolerating PO, +flatus, pain controlled.     Patient Active Problem List    Diagnosis    • Status post laparoscopic hysterectomy [Z90.710]    • Intramural leiomyoma of uterus [D25.1]    • Aftercare following surgery of the musculoskeletal system [Z47.89]    • Status post incision and drainage left medial ankle wound [Z98.890]    • Puncture wound of left ankle [S91.032A]    • Puncture wound of left ankle with complication [S91.032A]        Objective     Vital Signs Range for the last 24 hours  Temp: Temp:  [97.3 °F (36.3 °C)-98.7 °F (37.1 °C)] 98.1 °F (36.7 °C) Temp src: Temporal  BP: BP: (107-146)/(65-87) 115/71   BP Readings from Last 3 Encounters:   09/10/22 115/71   22 126/82   22 120/70     Pulse: Heart Rate:  [52-94] 67   Pulse Readings from Last 3 Encounters:   09/10/22 67   11/15/19 66   10/15/19 58     Resp:  Resp:  [16-18] 16    I/O last 3 completed shifts:  In: 4961 [P.O.:2300; I.V.:2661]  Out: 3100 [Urine:3100]  I/O this shift:  In: 720 [P.O.:720]  Out: 550 [Urine:550]      Results from last 7 days   Lab Units 22  1551 22  1051   WBC 10*3/mm3 11.92* 6.34   HEMOGLOBIN g/dL 14.2 14.1   HEMATOCRIT % 42.1 42.3   PLATELETS 10*3/mm3 321 300     Results from last 7 days   Lab Units 22  1551   SODIUM mmol/L 139   POTASSIUM mmol/L 3.9   CHLORIDE mmol/L 104   CO2 mmol/L 22.0   BUN mg/dL 11   CREATININE mg/dL 0.86   CALCIUM mg/dL 9.2   GLUCOSE mg/dL 161*     Results from last 7 days   Lab Units 22  1551   SODIUM mmol/L 139   POTASSIUM mmol/L 3.9   CHLORIDE mmol/L 104   CO2 mmol/L 22.0   BUN mg/dL 11   CREATININE mg/dL 0.86   GLUCOSE mg/dL 161*   CALCIUM mg/dL 9.2         Physical Exam:  General Appearance:  awake, alert, oriented, in no acute distress  Head/face:  NCAT  Lungs:  Normal expansion.  Clear to auscultation.  No rales, rhonchi, or  wheezing.  Heart:  Heart sounds are normal.  Regular rate and rhythm without murmur, gallop or rub.  Abdomen:  Skin: clean, dry, intact ecchymosis- at port sites  Auscultation: +BS  Palpation: soft  Extremities: NTTP, no edema, SCDS bilaterally      Assessment/Plan   1.  S/p TLH/USO POD1. Stable. Labs pending curently, but as long as stable ok for DC home this afternoon. Place estradiol patch prior to DC. She pooja FU in 2 wks.         Brianda Henao MD  9/10/2022 11:40 EDT

## 2022-09-10 NOTE — PLAN OF CARE
Problem: Adult Inpatient Plan of Care  Goal: Plan of Care Review  Flowsheets (Taken 9/10/2022 7273)  Progress: improving  Plan of Care Reviewed With: patient  Outcome Evaluation: VSS. Adequate I&O. Ambulating in halls. c/o gas pain. Spotting. Norco x1. Simethicone x2. Rested well. Anticipate DC in am.

## 2022-09-21 ENCOUNTER — OFFICE VISIT (OUTPATIENT)
Dept: OBSTETRICS AND GYNECOLOGY | Facility: CLINIC | Age: 52
End: 2022-09-21

## 2022-09-21 VITALS
HEIGHT: 64 IN | WEIGHT: 175.8 LBS | SYSTOLIC BLOOD PRESSURE: 122 MMHG | DIASTOLIC BLOOD PRESSURE: 84 MMHG | BODY MASS INDEX: 30.01 KG/M2

## 2022-09-21 DIAGNOSIS — Z48.89 POSTOPERATIVE VISIT: ICD-10-CM

## 2022-09-21 DIAGNOSIS — Z90.710 STATUS POST LAPAROSCOPIC HYSTERECTOMY: Primary | ICD-10-CM

## 2022-09-21 PROCEDURE — 99024 POSTOP FOLLOW-UP VISIT: CPT | Performed by: OBSTETRICS & GYNECOLOGY

## 2022-09-21 NOTE — PROGRESS NOTES
OBGYN Postoperative Exam Note          Subjective   Chief Complaint   Patient presents with   • Post-op     2 weeks     Ondina Loo is a 52 y.o. year old  presenting to be seen for her post-operative visit. She is S/P TLH with right salpingo-oophorectomy  on 22 at [facility] for uterine leiomyoma, ovarian cyst and pelvic pain. Currently she reports pain is well controlled. Patient reports some constipation. Patient also reports tenderness & wound redness on the incision on the the left side of her abdomen. Patient denies drainage or fever.    The pathology results from her procedure are in Ondina's record and were discussed with patient.      OTHER THINGS SHE WANTS TO DISCUSS TODAY:  Nothing else      Current Outpatient Medications:   •  estradiol (MINIVELLE, VIVELLE-DOT) 0.05 MG/24HR patch, Place 1 patch on the skin as directed by provider 2 (Two) Times a Week., Disp: 8 patch, Rfl: 6  •  melatonin 5 MG tablet tablet, Take 5 mg by mouth Every Night., Disp: , Rfl:   •  naratriptan (AMERGE) 1 MG tablet, Take 1 mg by mouth 1 (One) Time As Needed for Migraine., Disp: , Rfl:   •  SUMAtriptan (IMITREX) 100 MG tablet, Take 100 mg by mouth 1 (One) Time As Needed for Migraine., Disp: , Rfl:   •  tiZANidine (ZANAFLEX) 2 MG tablet, Take 1 mg by mouth Every Night., Disp: , Rfl: 0  •  topiramate (TOPAMAX) 100 MG tablet, Take 100 mg by mouth Every Night., Disp: , Rfl:      Past Medical History:   Diagnosis Date   • Constipation     due to loop in bowel-HISTORY OF NO CURRENT ISSUES   • GERD (gastroesophageal reflux disease)    • Migraines    • Ovarian cyst         Past Surgical History:   Procedure Laterality Date   • AUGMENTATION MAMMAPLASTY Bilateral ~     IMPLANTS EXPLANTED ~ 2018   • BREAST CAPSULOTOMY, IMPLANT REVISION Bilateral 2018    Procedure: BILATERAL CAPSULECTOMY AND BREAST IMPLANT REMOVAL;  Surgeon: Eileen Lares MD;  [facility]: [facility];  Service: Plastics   • COLONOSCOPY   "2017   • INCISION AND DRAINAGE LEG Left 10/15/2019    Procedure: I&D LEFT ANKLE PUNCTURE WOUND;  Surgeon: Jennifer Vidal MD;  Location:  [facility];  Service: Orthopedics   • INCISION AND DRAINAGE LEG Left 11/14/2019    Procedure: INCISION AND DRAINAGE LEFT ANKLE;  Surgeon: Jennifer Vidal MD;  Location:  [facility];  Service: Orthopedics   • OOPHORECTOMY Left    • TOTAL LAPAROSCOPIC HYSTERECTOMY SALPINGO OOPHORECTOMY N/A 9/9/2022    Procedure: TOTAL LAPAROSCOPIC HYSTERECTOMY RIGHT  SALPINGO OOPHORECTOMY;  Surgeon: Tash Amador MD;  Location:  [facility];  Service: Obstetrics/Gynecology;  Laterality: N/A;       The following portions of the patient's history were reviewed and updated as appropriate:current medications and allergies    Review of Systems   Constitutional: Negative.    HENT: Negative.    Eyes: Negative.    Respiratory: Negative.    Cardiovascular: Negative.    Gastrointestinal: Positive for constipation.   Endocrine: Negative.    Genitourinary: Negative.         Incision tenderness & redness.   Musculoskeletal: Negative.    Skin: Negative.    Allergic/Immunologic: Negative.    Neurological: Negative.    Hematological: Negative.    Psychiatric/Behavioral: Negative.           Objective   /84   Ht 162.6 cm (64\")   Wt 79.7 kg (175 lb 12.8 oz)   LMP 06/09/2021   BMI 30.18 kg/m²     Physical Exam  Vitals and nursing note reviewed.   Constitutional:       Appearance: She is well-developed.   HENT:      Head: Normocephalic and atraumatic.   Pulmonary:      Effort: Pulmonary effort is normal.   Abdominal:      General: A surgical scar is present.      Palpations: Abdomen is soft. Abdomen is not rigid.      Comments: Clean, Dry, and Intact.  No erythema.    Musculoskeletal:      Cervical back: Normal range of motion.   Neurological:      Mental Status: She is alert and oriented to person, place, and time.   Psychiatric:         Mood and Affect: Mood normal.         Behavior: Behavior normal.            "   Assessment   1. S/P TLH with bilateral salpingo-oophorectomy      Plan   1. Avoid tampons, douching and intercourse  2. LLQ tenderness at port site.  Healing well.   3. The importance of keeping all planned follow-up and taking all medications as prescribed was emphasized.  4. Return in about 4 weeks (around 10/19/2022).              Tash Amaodr MD  09/21/2022

## 2022-09-28 ENCOUNTER — HOSPITAL ENCOUNTER (OUTPATIENT)
Dept: GENERAL RADIOLOGY | Facility: HOSPITAL | Age: 52
Discharge: HOME | End: 2022-09-28
Admitting: INTERNAL MEDICINE

## 2022-09-28 ENCOUNTER — TRANSCRIBE ORDERS (OUTPATIENT)
Dept: ADMINISTRATIVE | Facility: HOSPITAL | Age: 52
End: 2022-09-28

## 2022-09-28 DIAGNOSIS — M54.16 LUMBAR RADICULOPATHY, CHRONIC: Primary | ICD-10-CM

## 2022-09-28 DIAGNOSIS — M54.16 LUMBAR RADICULOPATHY, CHRONIC: ICD-10-CM

## 2022-09-28 PROCEDURE — 72110 X-RAY EXAM L-2 SPINE 4/>VWS: CPT

## 2022-09-29 ENCOUNTER — TRANSCRIBE ORDERS (OUTPATIENT)
Dept: ADMINISTRATIVE | Facility: HOSPITAL | Age: 52
End: 2022-09-29

## 2022-09-29 DIAGNOSIS — M54.16 LUMBAR RADICULOPATHY, CHRONIC: Primary | ICD-10-CM

## 2022-10-20 ENCOUNTER — OFFICE VISIT (OUTPATIENT)
Dept: OBSTETRICS AND GYNECOLOGY | Facility: CLINIC | Age: 52
End: 2022-10-20

## 2022-10-20 VITALS
DIASTOLIC BLOOD PRESSURE: 86 MMHG | WEIGHT: 180.6 LBS | SYSTOLIC BLOOD PRESSURE: 130 MMHG | BODY MASS INDEX: 30.83 KG/M2 | HEIGHT: 64 IN

## 2022-10-20 DIAGNOSIS — Z48.89 POSTOPERATIVE VISIT: ICD-10-CM

## 2022-10-20 DIAGNOSIS — Z79.890 HORMONE REPLACEMENT THERAPY: Primary | ICD-10-CM

## 2022-10-20 PROCEDURE — 99024 POSTOP FOLLOW-UP VISIT: CPT | Performed by: OBSTETRICS & GYNECOLOGY

## 2022-10-20 RX ORDER — ESTRADIOL 0.04 MG/D
1 FILM, EXTENDED RELEASE TRANSDERMAL 2 TIMES WEEKLY
Qty: 8 PATCH | Refills: 12 | Status: SHIPPED | OUTPATIENT
Start: 2022-10-20 | End: 2023-10-20

## 2022-10-20 RX ORDER — PROMETHAZINE HYDROCHLORIDE 25 MG/1
25 TABLET ORAL EVERY 8 HOURS PRN
COMMUNITY
Start: 2022-09-28

## 2022-10-20 NOTE — PROGRESS NOTES
OBGYN Postoperative Exam Note          Subjective   Chief Complaint   Patient presents with   • Post-op     6 weeks     Ondina Loo is a 52 y.o. year old  presenting to be seen for her post-operative visit. She is S/P H with right salpingo-oophorectomy  on 22 at [facility] for Ovarian Cyst, Uterine leiomyoma, and Pelvic pain. Currently she reports no problems with eating, bowel movements, voiding, or wound drainage and pain is well controlled.    The pathology results from her procedure are in Ondina's record and are benign    OTHER THINGS SHE WANTS TO DISCUSS TODAY:  Needs a return to work release      Current Outpatient Medications:   •  estradiol (MINIVELLE, VIVELLE-DOT) 0.05 MG/24HR patch, Place 1 patch on the skin as directed by provider 2 (Two) Times a Week. (Patient taking differently: Place 1 patch on the skin as directed by provider 2 (Two) Times a Week. Patient using 1/2 a patch), Disp: 8 patch, Rfl: 6  •  melatonin 5 MG tablet tablet, Take 5 mg by mouth Every Night., Disp: , Rfl:   •  naratriptan (AMERGE) 1 MG tablet, Take 1 mg by mouth 1 (One) Time As Needed for Migraine., Disp: , Rfl:   •  promethazine (PHENERGAN) 25 MG tablet, Take 1 tablet by mouth Every 8 (Eight) Hours As Needed. for nausea, Disp: , Rfl:   •  SUMAtriptan (IMITREX) 100 MG tablet, Take 100 mg by mouth 1 (One) Time As Needed for Migraine., Disp: , Rfl:   •  tiZANidine (ZANAFLEX) 2 MG tablet, Take 1 mg by mouth Every Night., Disp: , Rfl: 0  •  topiramate (TOPAMAX) 100 MG tablet, Take 100 mg by mouth Every Night., Disp: , Rfl:      Past Medical History:   Diagnosis Date   • Constipation     due to loop in bowel-HISTORY OF NO CURRENT ISSUES   • GERD (gastroesophageal reflux disease)    • Migraines    • Ovarian cyst         Past Surgical History:   Procedure Laterality Date   • AUGMENTATION MAMMAPLASTY Bilateral ~     IMPLANTS EXPLANTED ~ 2018   • BREAST CAPSULOTOMY, IMPLANT REVISION Bilateral 2018     "Problem: Patient Care Overview (Adult)  Goal: Plan of Care Review  Outcome: Ongoing (interventions implemented as appropriate)    04/13/17 1014   Coping/Psychosocial Response Interventions   Plan Of Care Reviewed With patient   Patient Care Overview   Progress improving   Outcome Evaluation   Outcome Summary/Follow up Plan Pt presented to PT with decreased pain from yesterday after having some lines/tubes removed. He performed all seated exercises with demonstration and all functional mobility with min A x2. Pt ambulated in silva for 70' before requiring seated rest because he was \"running out of air\". Pt's O2 sats remained >92% while with PT. Pt will continue to benefit from skilled PT in order to increase functional mobility independence so he can return home with wife.           " "Procedure: BILATERAL CAPSULECTOMY AND BREAST IMPLANT REMOVAL;  Surgeon: Eileen Lares MD;  Location:  [facility];  Service: Plastics   • COLONOSCOPY  2017   • INCISION AND DRAINAGE LEG Left 10/15/2019    Procedure: I&D LEFT ANKLE PUNCTURE WOUND;  Surgeon: Jennifer Vidal MD;  Location:  [facility];  Service: Orthopedics   • INCISION AND DRAINAGE LEG Left 11/14/2019    Procedure: INCISION AND DRAINAGE LEFT ANKLE;  Surgeon: Jennifer Vidal MD;  Location:  [facility];  Service: Orthopedics   • OOPHORECTOMY Left    • TOTAL LAPAROSCOPIC HYSTERECTOMY SALPINGO OOPHORECTOMY N/A 9/9/2022    Procedure: TOTAL LAPAROSCOPIC HYSTERECTOMY RIGHT  SALPINGO OOPHORECTOMY;  Surgeon: Tash Amador MD;  Location:  [facility];  Service: Obstetrics/Gynecology;  Laterality: N/A;       The following portions of the patient's history were reviewed and updated as appropriate:current medications and allergies    Review of Systems   Constitutional: Negative.    HENT: Negative.    Eyes: Negative.    Respiratory: Negative.    Cardiovascular: Negative.    Gastrointestinal: Negative.    Endocrine: Negative.    Genitourinary: Negative.    Musculoskeletal: Negative.    Skin: Negative.    Allergic/Immunologic: Negative.    Neurological: Negative.    Hematological: Negative.    Psychiatric/Behavioral: Negative.           Objective   /86   Ht 162.6 cm (64\")   Wt 81.9 kg (180 lb 9.6 oz)   LMP 06/09/2021   BMI 31.00 kg/m²     Physical Exam  Vitals and nursing note reviewed.   Constitutional:       Appearance: She is well-developed.   HENT:      Head: Normocephalic and atraumatic.   Eyes:      Conjunctiva/sclera: Conjunctivae normal.   Pulmonary:      Effort: Pulmonary effort is normal.   Abdominal:      General: A surgical scar is present.      Palpations: Abdomen is soft. Abdomen is not rigid.      Comments: Clean, Dry, and Intact.  No erythema.    Genitourinary:     Labia:         Right: No rash.         Left: No rash.       Urethra: No " prolapse or urethral pain.      Vagina: Normal.      Uterus: Absent.       Comments: Cuff healing well  Musculoskeletal:      Cervical back: Normal range of motion.   Neurological:      Mental Status: She is alert and oriented to person, place, and time.   Psychiatric:         Mood and Affect: Mood normal.         Behavior: Behavior normal.              Assessment   1. S/P TLH/BSO     Plan   1. Nothing per vagina still until 4 weeks after her procedure.  2. Discussed ERT in light of her migraines.  Could lower to .0375  3. The importance of keeping all planned follow-up and taking all medications as prescribed was emphasized.  4. Return in about 1 year (around 10/20/2023), or if symptoms worsen or fail to improve.              Tash Amador MD  10/20/2022

## 2022-11-15 ENCOUNTER — APPOINTMENT (OUTPATIENT)
Dept: MRI IMAGING | Facility: HOSPITAL | Age: 52
End: 2022-11-15

## 2023-04-24 ENCOUNTER — LAB (OUTPATIENT)
Dept: LAB | Facility: HOSPITAL | Age: 53
End: 2023-04-24
Payer: COMMERCIAL

## 2023-04-24 ENCOUNTER — TRANSCRIBE ORDERS (OUTPATIENT)
Dept: LAB | Facility: HOSPITAL | Age: 53
End: 2023-04-24
Payer: COMMERCIAL

## 2023-04-24 DIAGNOSIS — Z00.00 ROUTINE GENERAL MEDICAL EXAMINATION AT A HEALTH CARE FACILITY: ICD-10-CM

## 2023-04-24 DIAGNOSIS — Z00.00 ROUTINE GENERAL MEDICAL EXAMINATION AT A HEALTH CARE FACILITY: Primary | ICD-10-CM

## 2023-04-24 LAB
ALBUMIN SERPL-MCNC: 5 G/DL (ref 3.5–5.2)
ALBUMIN/GLOB SERPL: 1.7 G/DL
ALP SERPL-CCNC: 69 U/L (ref 39–117)
ALT SERPL W P-5'-P-CCNC: 14 U/L (ref 1–33)
ANION GAP SERPL CALCULATED.3IONS-SCNC: 12 MMOL/L (ref 5–15)
AST SERPL-CCNC: 18 U/L (ref 1–32)
BASOPHILS # BLD AUTO: 0.07 10*3/MM3 (ref 0–0.2)
BASOPHILS NFR BLD AUTO: 0.9 % (ref 0–1.5)
BILIRUB SERPL-MCNC: 0.3 MG/DL (ref 0–1.2)
BUN SERPL-MCNC: 17 MG/DL (ref 6–20)
BUN/CREAT SERPL: 19.1 (ref 7–25)
CALCIUM SPEC-SCNC: 9.4 MG/DL (ref 8.6–10.5)
CHLORIDE SERPL-SCNC: 104 MMOL/L (ref 98–107)
CHOLEST SERPL-MCNC: 254 MG/DL (ref 0–200)
CO2 SERPL-SCNC: 23 MMOL/L (ref 22–29)
CREAT SERPL-MCNC: 0.89 MG/DL (ref 0.57–1)
DEPRECATED RDW RBC AUTO: 43.3 FL (ref 37–54)
EGFRCR SERPLBLD CKD-EPI 2021: 79.1 ML/MIN/1.73
EOSINOPHIL # BLD AUTO: 0.41 10*3/MM3 (ref 0–0.4)
EOSINOPHIL NFR BLD AUTO: 5.6 % (ref 0.3–6.2)
ERYTHROCYTE [DISTWIDTH] IN BLOOD BY AUTOMATED COUNT: 12.7 % (ref 12.3–15.4)
GLOBULIN UR ELPH-MCNC: 3 GM/DL
GLUCOSE SERPL-MCNC: 88 MG/DL (ref 65–99)
HBA1C MFR BLD: 5 % (ref 4.8–5.6)
HCT VFR BLD AUTO: 43.8 % (ref 34–46.6)
HDLC SERPL-MCNC: 77 MG/DL (ref 40–60)
HGB BLD-MCNC: 14.9 G/DL (ref 12–15.9)
IMM GRANULOCYTES # BLD AUTO: 0.02 10*3/MM3 (ref 0–0.05)
IMM GRANULOCYTES NFR BLD AUTO: 0.3 % (ref 0–0.5)
LDLC SERPL CALC-MCNC: 157 MG/DL (ref 0–100)
LDLC/HDLC SERPL: 2.01 {RATIO}
LYMPHOCYTES # BLD AUTO: 2.31 10*3/MM3 (ref 0.7–3.1)
LYMPHOCYTES NFR BLD AUTO: 31.3 % (ref 19.6–45.3)
MCH RBC QN AUTO: 31.3 PG (ref 26.6–33)
MCHC RBC AUTO-ENTMCNC: 34 G/DL (ref 31.5–35.7)
MCV RBC AUTO: 92 FL (ref 79–97)
MONOCYTES # BLD AUTO: 0.48 10*3/MM3 (ref 0.1–0.9)
MONOCYTES NFR BLD AUTO: 6.5 % (ref 5–12)
NEUTROPHILS NFR BLD AUTO: 4.08 10*3/MM3 (ref 1.7–7)
NEUTROPHILS NFR BLD AUTO: 55.4 % (ref 42.7–76)
NRBC BLD AUTO-RTO: 0 /100 WBC (ref 0–0.2)
PLATELET # BLD AUTO: 324 10*3/MM3 (ref 140–450)
PMV BLD AUTO: 10.2 FL (ref 6–12)
POTASSIUM SERPL-SCNC: 3.8 MMOL/L (ref 3.5–5.2)
PROT SERPL-MCNC: 8 G/DL (ref 6–8.5)
RBC # BLD AUTO: 4.76 10*6/MM3 (ref 3.77–5.28)
SODIUM SERPL-SCNC: 139 MMOL/L (ref 136–145)
T4 FREE SERPL-MCNC: 1.46 NG/DL (ref 0.93–1.7)
TRIGL SERPL-MCNC: 113 MG/DL (ref 0–150)
TSH SERPL DL<=0.05 MIU/L-ACNC: 2.7 UIU/ML (ref 0.27–4.2)
VLDLC SERPL-MCNC: 20 MG/DL (ref 5–40)
WBC NRBC COR # BLD: 7.37 10*3/MM3 (ref 3.4–10.8)

## 2023-04-24 PROCEDURE — 83036 HEMOGLOBIN GLYCOSYLATED A1C: CPT | Performed by: INTERNAL MEDICINE

## 2023-04-24 PROCEDURE — 80061 LIPID PANEL: CPT | Performed by: INTERNAL MEDICINE

## 2023-04-24 PROCEDURE — 36415 COLL VENOUS BLD VENIPUNCTURE: CPT

## 2023-04-24 PROCEDURE — 84439 ASSAY OF FREE THYROXINE: CPT | Performed by: INTERNAL MEDICINE

## 2023-04-24 PROCEDURE — 80050 GENERAL HEALTH PANEL: CPT

## 2023-07-26 NOTE — PROGRESS NOTES
Chief Complaint   Patient presents with   • f/u for RLQ pain       Subjective   HPI  Ondina Loo is a 51 y.o. female, , who presents for a follow up regarding RLQ pain.    Patient was last seen for her annual exam 2022.  At that time, she c/o occasional  RLQ/ pelvic pain. She also mentioned that her periods are rare and when she has them, they are heavy in flow with cramping. She reported that she had not had a period in over a year and then started having them once she was started on hormones following her last visit in  in .  Her last LMP noted is 2021.  She stopped taking the hormones due to bad/ increased frequency of migraines. She is currently not taking any hormones.   She was advised to RTO for ultrasound and f/u evaluation.      Today, patient denies any additional questions, concerns or complaints.     She had an ultrasound that had two fibroids noted measuring, 1. 5.6 x 2.7 x 3.5 mm (Anterior) and 2. 3.9 x 2.9 x 3.7 mm (Posterior).  There was a right adnexal mass seen on the medial side of her right ovary, measuring 31 x 14 x 20 mm.  Her left ovary was absent, which is consistent with a h/o prior left oophorectomy.      Patient's last menstrual period was 2021..  Partner Status: Marital Status: .  New Partners since last visit: no.  Desires STD Screening: no.    Additional OB/GYN History   Current contraception: contraceptive methods: None  Desires to: do not start contraception  Last Pap : 2020- HPV regardless- negative  Last Completed Pap Smear          PAP SMEAR (Every 3 Years) Next due on 2020  Done - neg, neg HPV              History of abnormal Pap smear: no  Last mammogram: 2021  Last Completed Mammogram          MAMMOGRAM (Yearly) Next due on 2021  Mammo Screening Digital Tomosynthesis Bilateral With CAD    2014  MAMMOGRAPHY SCREENING BILATERAL              Tobacco Usage?: No   OB History        2     Para   1    Term   1            AB   1    Living   1       SAB   1    IAB        Ectopic        Molar        Multiple        Live Births   1                Health Maintenance   Topic Date Due   • ANNUAL PHYSICAL  Never done   • TDAP/TD VACCINES (1 - Tdap) Never done   • INFLUENZA VACCINE  Never done   • MAMMOGRAM  2022   • PAP SMEAR  2023   • Annual Gynecologic Pelvic and Breast Exam  2023   • COLORECTAL CANCER SCREENING  2027   • HEPATITIS C SCREENING  Completed   • COVID-19 Vaccine  Completed   • Pneumococcal Vaccine 0-64  Aged Out       The additional following portions of the patient's history were reviewed and updated as appropriate: allergies, current medications, past family history, past medical history, past social history, past surgical history and problem list.    Review of Systems    I have reviewed and agree with the HPI, ROS, and historical information as entered above. Tash Amador MD    Objective   /60   Wt 79.5 kg (175 lb 3.2 oz)   LMP 2021   Breastfeeding No   BMI 29.61 kg/m²     Physical Exam  Constitutional:       Appearance: She is well-developed.   HENT:      Head: Normocephalic.   Eyes:      Conjunctiva/sclera: Conjunctivae normal.   Pulmonary:      Effort: Pulmonary effort is normal.   Psychiatric:         Behavior: Behavior normal.         Assessment/Plan     Assessment     Problem List Items Addressed This Visit    None     Visit Diagnoses     Ovarian mass, right    -  Primary    Relevant Orders    CBC (No Diff)    Comprehensive Metabolic Panel    Hemoglobin A1c    Hepatitis C Antibody    T4, Free    TSH    Vitamin D 25 Hydroxy    Vitamin B12    Folate        Fatigue, unspecified type        Relevant Orders    CBC (No Diff)    Comprehensive Metabolic Panel    Hemoglobin A1c    Hepatitis C Antibody    T4, Free    TSH    Vitamin D 25 Hydroxy    Vitamin B12    Folate        Cyst of right ovary        Intramural leiomyoma of  uterus              Plan     Return in about 3 months (around 6/15/2022) for US with Next Visit.   Reviewed u/s and findings of 3 cm complex mass on right ovary.  Slightly bigger than previous imaging in June.  ?dermoid in nature .  Offered surgical management but she would really like to avoid surgery if possible. WIll check  and do short term U/S follow up for now.      Tash Amador MD  03/15/2022   Mother/Father

## 2023-08-08 ENCOUNTER — TRANSCRIBE ORDERS (OUTPATIENT)
Dept: OBSTETRICS AND GYNECOLOGY | Facility: CLINIC | Age: 53
End: 2023-08-08
Payer: COMMERCIAL

## 2023-08-08 DIAGNOSIS — Z12.31 BREAST CANCER SCREENING BY MAMMOGRAM: Primary | ICD-10-CM

## 2023-10-05 ENCOUNTER — HOSPITAL ENCOUNTER (OUTPATIENT)
Dept: MAMMOGRAPHY | Facility: HOSPITAL | Age: 53
Discharge: HOME | End: 2023-10-05
Admitting: OBSTETRICS & GYNECOLOGY
Payer: COMMERCIAL

## 2023-10-05 DIAGNOSIS — Z12.31 BREAST CANCER SCREENING BY MAMMOGRAM: ICD-10-CM

## 2023-10-05 PROCEDURE — 77063 BREAST TOMOSYNTHESIS BI: CPT

## 2023-10-05 PROCEDURE — 77067 SCR MAMMO BI INCL CAD: CPT

## 2024-05-01 ENCOUNTER — TELEPHONE (OUTPATIENT)
Dept: OBSTETRICS AND GYNECOLOGY | Facility: CLINIC | Age: 54
End: 2024-05-01
Payer: COMMERCIAL

## 2024-05-01 NOTE — TELEPHONE ENCOUNTER
Patient is calling to get an refill her hormone medication. Vivelle .5mg     Patient schedule 9-5-24 for Annual    Pharmacy has been verified   
Phone number listed was wrong number. Just Fab message sent for pt to  call with updated number. She has not been seen here since 2022. Her Rx  in 2023. She will need to come in sooner for us to reorder this. Is she willing to see an APRN for her annual instead of Dr. Amador? We could get her in much sooner if so.  
no

## 2024-05-02 ENCOUNTER — TELEPHONE (OUTPATIENT)
Dept: OBSTETRICS AND GYNECOLOGY | Facility: CLINIC | Age: 54
End: 2024-05-02

## 2024-05-02 NOTE — TELEPHONE ENCOUNTER
PT is following up on telephone mg from yesterday PT was told she can't get a refill until seen in office PT scheduled for 09/05/24 NP offered, but PT declined and states she is willing  to go off of medication until she can see Amador.

## 2024-09-05 ENCOUNTER — OFFICE VISIT (OUTPATIENT)
Dept: OBSTETRICS AND GYNECOLOGY | Facility: CLINIC | Age: 54
End: 2024-09-05
Payer: COMMERCIAL

## 2024-09-05 VITALS
WEIGHT: 140.8 LBS | BODY MASS INDEX: 24.04 KG/M2 | DIASTOLIC BLOOD PRESSURE: 70 MMHG | HEIGHT: 64 IN | SYSTOLIC BLOOD PRESSURE: 124 MMHG

## 2024-09-05 DIAGNOSIS — Z01.419 WOMEN'S ANNUAL ROUTINE GYNECOLOGICAL EXAMINATION: Primary | ICD-10-CM

## 2024-09-05 DIAGNOSIS — Z90.710 STATUS POST LAPAROSCOPIC HYSTERECTOMY: ICD-10-CM

## 2024-09-05 DIAGNOSIS — Z12.39 ENCOUNTER FOR BREAST CANCER SCREENING USING NON-MAMMOGRAM MODALITY: ICD-10-CM

## 2024-09-05 PROBLEM — D25.1 INTRAMURAL LEIOMYOMA OF UTERUS: Status: RESOLVED | Noted: 2022-09-09 | Resolved: 2024-09-05

## 2024-09-05 RX ORDER — GALCANEZUMAB 120 MG/ML
INJECTION, SOLUTION SUBCUTANEOUS
COMMUNITY
Start: 2024-09-02

## 2024-09-05 RX ORDER — ESZOPICLONE 3 MG/1
3 TABLET, FILM COATED ORAL AS NEEDED
COMMUNITY
Start: 2024-07-08

## 2024-09-05 RX ORDER — ESTRADIOL 0.05 MG/D
PATCH, EXTENDED RELEASE TRANSDERMAL
COMMUNITY
Start: 2024-08-31 | End: 2024-09-06 | Stop reason: SDUPTHER

## 2024-09-05 RX ORDER — ETODOLAC 500 MG/1
TABLET, FILM COATED ORAL
COMMUNITY
Start: 2024-08-29

## 2024-09-05 NOTE — PROGRESS NOTES
Gynecologic Annual Exam Note        GYN Annual Exam     CC - Here for annual exam.        HPI  Ondina Loo is a 54 y.o. female, , who presents for annual well woman exam as an established patient. She is s/p TLH/RSO in 2022 for Ovarian Cyst, Uterine leiomyoma, and Pelvic pain. Denies vaginal bleeding.   There were no changes to her medical or surgical history since her last visit. Marital Status: .  She is not currently sexually active. STD testing recommendations have been explained to the patient and she declines STD testing.    Patient reports her mother passed away last August.    The patient would like to discuss the following complaints today: zero libido    Additional OB/GYN History   On HRT? Yes; vivelle dot patch 0.05mg    Last Pap: 2020. Results: negative. HPV: negative.   Last Completed Pap Smear       This patient has no relevant Health Maintenance data.          History of abnormal Pap smear: no  Family history of uterine, colon, breast, or ovarian cancer: yes - maternal grandmother breast/ovarian cancer and paternal aunt breast cancer  Performs monthly Self-Breast Exam: yes  Last mammogram: 10/5/2023. Done at [facility]. There is a copy in the chart.    Last Completed Mammogram       This patient has no relevant Health Maintenance data.          Last colonoscopy: has had a colonoscopy 7 years ago    Last Completed Colonoscopy            COLORECTAL CANCER SCREENING (COLONOSCOPY - Every 10 Years) Next due on 2017  COLONOSCOPY (Done)                    She has never had a bone density scan  Exercises Regularly: yes, 3-4x weekly  Feelings of Anxiety or Depression: no      Tobacco Usage?: No       Current Outpatient Medications:     Emgality 120 MG/ML auto-injector pen, , Disp: , Rfl:     estradiol (MINIVELLE, VIVELLE-DOT) 0.05 MG/24HR patch, , Disp: , Rfl:     eszopiclone (LUNESTA) 3 MG tablet, Take 1 tablet by mouth As Needed., Disp: , Rfl:     MAGNESIUM  "PO, Take  by mouth., Disp: , Rfl:     melatonin 5 MG tablet tablet, Take 1 tablet by mouth Every Night., Disp: , Rfl:     promethazine (PHENERGAN) 25 MG tablet, Take 1 tablet by mouth Every 8 (Eight) Hours As Needed. for nausea, Disp: , Rfl:     SUMAtriptan (IMITREX) 100 MG tablet, Take 1 tablet by mouth 1 (One) Time As Needed for Migraine., Disp: , Rfl:     Sure Comfort Insulin Syringe 31G X \" 0.5 ML misc, \"USE AS directed\", Disp: , Rfl:     Tirzepatide (MOUNJARO) 7.5 MG/0.5ML solution pen-injector pen, 0.5 mL 1 (One) Time Per Week., Disp: , Rfl:     tiZANidine (ZANAFLEX) 2 MG tablet, Take 0.5 tablets by mouth Every Night., Disp: , Rfl: 0    Vitamin D-Vitamin K (VITAMIN D2 + K1 PO), Take  by mouth., Disp: , Rfl:     cephalexin (KEFLEX) 500 MG capsule, Take 1 capsule by mouth 3 times a day., Disp: 15 capsule, Rfl: 0    estradiol (Vivelle-Dot) 0.0375 MG/24HR patch, Place 1 patch on the skin as directed by provider 2 (Two) Times a Week. (Patient not taking: Reported on 2024), Disp: 8 patch, Rfl: 12    naratriptan (AMERGE) 1 MG tablet, Take 1 mg by mouth 1 (One) Time As Needed for Migraine. (Patient not taking: Reported on 2024), Disp: , Rfl:     Tirzepatide (Mounjaro) 2.5 MG/0.5ML solution pen-injector, Inject 2.5 mg under the skin into the appropriate area as directed 1 (One) Time Per Week. (Patient not taking: Reported on 2024), Disp: 2 mL, Rfl: 0    Tirzepatide (Mounjaro) 5 MG/0.5ML solution pen-injector, Inject 0.5 mL under the skin into the appropriate area as directed 1 (One) Time Per Week. (Patient not taking: Reported on 2024), Disp: 0.5 mL, Rfl: 0    topiramate (TOPAMAX) 100 MG tablet, Take 100 mg by mouth Every Night. (Patient not taking: Reported on 2024), Disp: , Rfl:     Patient is requesting refills of vivelle dot 0.05mg.    OB History          2    Para   1    Term   1       0    AB   1    Living   1         SAB   1    IAB   0    Ectopic   0    Molar   0    Multiple "   0    Live Births   1                Past Medical History:   Diagnosis Date    Constipation     improved since hysterectomy; due to loop in bowel-HISTORY OF NO CURRENT ISSUES    GERD (gastroesophageal reflux disease)     History of ovarian cyst     R ovarian cyst; L ovarian teratoma - both removed    Migraine headache with aura         Past Surgical History:   Procedure Laterality Date    AUGMENTATION MAMMAPLASTY Bilateral ~ 2012    IMPLANTS EXPLANTED ~ 2018    BREAST CAPSULOTOMY, IMPLANT REVISION Bilateral 05/08/2018    Procedure: BILATERAL CAPSULECTOMY AND BREAST IMPLANT REMOVAL;  Surgeon: Eileen Lares MD;  Location:  [facility];  Service: Plastics    COLONOSCOPY  2017    INCISION AND DRAINAGE LEG Left 10/15/2019    Procedure: I&D LEFT ANKLE PUNCTURE WOUND;  Surgeon: Jennifer Vidal MD;  Location:  [facility];  Service: Orthopedics    INCISION AND DRAINAGE LEG Left 11/14/2019    Procedure: INCISION AND DRAINAGE LEFT ANKLE;  Surgeon: Jennifer Vidal MD;  Location:  [facility];  Service: Orthopedics    OOPHORECTOMY Left     for teratoma    TOTAL LAPAROSCOPIC HYSTERECTOMY SALPINGO OOPHORECTOMY N/A 09/09/2022    with RSO for Ovarian Cyst, Uterine leiomyoma, and Pelvic pain. Procedure: TOTAL LAPAROSCOPIC HYSTERECTOMY RIGHT  SALPINGO OOPHORECTOMY;  Surgeon: Tash Amador MD;  Location:  [facility];  Service: Obstetrics/Gynecology;  Laterality: N/A;       Health Maintenance   Topic Date Due    DXA SCAN  Never done    TDAP/TD VACCINES (1 - Tdap) Never done    ANNUAL PHYSICAL  Never done    ZOSTER VACCINE (1 of 2) Never done    PAP SMEAR  02/04/2023    Annual Gynecologic Pelvic and Breast Exam  02/23/2023    COVID-19 Vaccine (4 - 2023-24 season) 09/01/2024    INFLUENZA VACCINE  08/01/2024    MAMMOGRAM  10/05/2024    COLORECTAL CANCER SCREENING  05/19/2027    HEPATITIS C SCREENING  Completed    Pneumococcal Vaccine 0-64  Aged Out       The additional following portions of the patient's history were reviewed and updated  "as appropriate: allergies, current medications, past family history, past medical history, past social history, past surgical history, and problem list.    Review of Systems   Constitutional: Negative.    HENT: Negative.     Eyes: Negative.    Respiratory: Negative.     Cardiovascular: Negative.    Gastrointestinal: Negative.    Endocrine: Negative.    Genitourinary:  Positive for decreased libido.   Musculoskeletal: Negative.    Skin: Negative.    Allergic/Immunologic: Negative.    Neurological: Negative.    Hematological: Negative.    Psychiatric/Behavioral: Negative.         I have reviewed and agree with the HPI, ROS, and historical information as entered above. Tash Amador MD      Objective   /70 (BP Location: Right arm, Patient Position: Sitting, Cuff Size: Adult)   Ht 162.6 cm (64\")   Wt 63.9 kg (140 lb 12.8 oz)   LMP 06/09/2021   BMI 24.17 kg/m²     Physical Exam  Vitals and nursing note reviewed. Exam conducted with a chaperone present.   Constitutional:       Appearance: She is well-developed.   HENT:      Head: Normocephalic and atraumatic.   Neck:      Thyroid: No thyroid mass or thyromegaly.   Cardiovascular:      Rate and Rhythm: Normal rate and regular rhythm.      Heart sounds: No murmur heard.  Pulmonary:      Effort: Pulmonary effort is normal. No retractions.      Breath sounds: Normal breath sounds. No wheezing, rhonchi or rales.   Chest:      Chest wall: No mass or tenderness.   Breasts:     Right: Normal. No mass, nipple discharge, skin change or tenderness.      Left: Normal. No mass, nipple discharge, skin change or tenderness.   Abdominal:      General: Bowel sounds are normal.      Palpations: Abdomen is soft. Abdomen is not rigid. There is no mass.      Tenderness: There is no abdominal tenderness. There is no guarding.      Hernia: No hernia is present. There is no hernia in the left inguinal area or right inguinal area.   Genitourinary:     General: Normal vulva.      Exam " position: Lithotomy position.      Pubic Area: No rash.       Labia:         Right: No rash, tenderness or lesion.         Left: No rash, tenderness or lesion.       Urethra: No urethral pain or urethral swelling.      Vagina: Normal. No vaginal discharge or lesions.      Uterus: Absent.       Adnexa:         Right: No mass, tenderness or fullness.          Left: No mass, tenderness or fullness.        Rectum: No external hemorrhoid.      Comments: Cervix surgically absent.  Vaginal cuff intact.  Musculoskeletal:      Cervical back: Normal range of motion. No muscular tenderness.   Neurological:      Mental Status: She is alert and oriented to person, place, and time.   Psychiatric:         Behavior: Behavior normal.            Assessment and Plan    Problem List Items Addressed This Visit          Genitourinary and Reproductive     Status post laparoscopic hysterectomy     Other Visit Diagnoses       Women's annual routine gynecological examination    -  Primary    Encounter for breast cancer screening using non-mammogram modality                GYN annual well woman exam.   Recommended use of Vitamin D replacement and getting adequate calcium in her diet. (1500mg)  Reviewed monthly self breast exams.  Instructed to call with lumps, pain, or breast discharge.    Continue yearly mammography  Reviewed HPV guidelines.  Reviewed exercise as a preventative health measures.   Reviewed risks/benefits of ERT including the lack of evidence estrogen alone increases the risk of breast cancer or stroke and decreases risk of hip fracture, colon cancer and all cause mortality.  However there is concern slight increase risk of pulmonary embolism.   Patient strongly desires to stay on or start HRT.  She understands she will use the lowest dose that adequately controls her symptoms.  Decreased libido - Discussed options.   Discussed options and will add testosterone to her regimen.  Return in about 1 year (around 9/5/2025).          Tash Amador MD  09/05/2024

## 2024-09-06 RX ORDER — ESTRADIOL 0.05 MG/D
1 PATCH, EXTENDED RELEASE TRANSDERMAL 2 TIMES WEEKLY
Qty: 24 PATCH | Refills: 3 | Status: SHIPPED | OUTPATIENT
Start: 2024-09-09

## 2024-10-03 ENCOUNTER — TELEPHONE (OUTPATIENT)
Dept: OBSTETRICS AND GYNECOLOGY | Facility: CLINIC | Age: 54
End: 2024-10-03
Payer: COMMERCIAL

## 2024-10-03 DIAGNOSIS — R68.82 DECREASED LIBIDO: Primary | ICD-10-CM

## 2024-10-03 RX ORDER — TESTOSTERONE 10 MG/.5G
10 GEL, METERED TOPICAL DAILY
Qty: 3 G | Refills: 4 | Status: SHIPPED | OUTPATIENT
Start: 2024-10-03

## 2024-10-03 NOTE — TELEPHONE ENCOUNTER
"Patient of Dr. Amador; LOV 09/05/24. Office note states \"will add testosterone to her regimen\".   Returned patient's call.   States Rx for compounded Testosterone cream was supposed to be sent to [facility]. Asking if it can be sent.   Informed patient I will check with Dr. Amador. She v/u and agreed.   "

## 2024-10-03 NOTE — TELEPHONE ENCOUNTER
Pt called and stated that she was supposed to have a testosterone cream sent into the compound pharmacy. Pt was asking if this could be done.

## 2024-10-03 NOTE — TELEPHONE ENCOUNTER
Spoke with patient. Informed her that I spoke with Dr. Amador and stated she would send in the RX. Patient v/u and agreed.

## 2025-01-20 ENCOUNTER — DOCUMENTATION (OUTPATIENT)
Dept: GENETICS | Facility: HOSPITAL | Age: 55
End: 2025-01-20
Payer: COMMERCIAL

## 2025-01-20 LAB
NCCN CRITERIA FLAG: ABNORMAL
TYRER CUZICK SCORE: 10.1

## 2025-01-21 ENCOUNTER — HOSPITAL ENCOUNTER (OUTPATIENT)
Dept: MAMMOGRAPHY | Facility: HOSPITAL | Age: 55
Discharge: HOME | End: 2025-01-21
Admitting: OBSTETRICS & GYNECOLOGY
Payer: COMMERCIAL

## 2025-01-21 DIAGNOSIS — Z12.39 ENCOUNTER FOR BREAST CANCER SCREENING USING NON-MAMMOGRAM MODALITY: ICD-10-CM

## 2025-01-21 PROCEDURE — 77063 BREAST TOMOSYNTHESIS BI: CPT

## 2025-01-21 PROCEDURE — 77067 SCR MAMMO BI INCL CAD: CPT

## 2025-01-29 ENCOUNTER — HOSPITAL ENCOUNTER (OUTPATIENT)
Dept: MAMMOGRAPHY | Facility: HOSPITAL | Age: 55
Discharge: HOME | End: 2025-01-29
Payer: COMMERCIAL

## 2025-01-29 ENCOUNTER — HOSPITAL ENCOUNTER (OUTPATIENT)
Dept: ULTRASOUND IMAGING | Facility: HOSPITAL | Age: 55
Discharge: HOME | End: 2025-01-29
Payer: COMMERCIAL

## 2025-01-29 DIAGNOSIS — R92.8 ABNORMAL MAMMOGRAM: ICD-10-CM

## 2025-01-29 PROCEDURE — G0279 TOMOSYNTHESIS, MAMMO: HCPCS

## 2025-01-29 PROCEDURE — 76642 ULTRASOUND BREAST LIMITED: CPT

## 2025-01-29 PROCEDURE — 77066 DX MAMMO INCL CAD BI: CPT

## 2025-04-07 DIAGNOSIS — R68.82 DECREASED LIBIDO: ICD-10-CM

## 2025-04-14 RX ORDER — TESTOSTERONE 10 MG/.5G
10 GEL, METERED TOPICAL DAILY
Qty: 3 G | Refills: 4 | Status: SHIPPED | OUTPATIENT
Start: 2025-04-14

## (undated) DEVICE — SPNG GZ STRL 2S 4X4 12PLY

## (undated) DEVICE — 1010 S-DRAPE TOWEL DRAPE 10/BX: Brand: STERI-DRAPE™

## (undated) DEVICE — GLV SURG SENSICARE MICRO PF LF 6 STRL

## (undated) DEVICE — ADAPT ST INFUS ADMIN SYR 70IN

## (undated) DEVICE — ENDOCUT SCISSOR TIP, DISPOSABLE: Brand: RENEW

## (undated) DEVICE — ENDOPATH XCEL BLADELESS TROCARS WITH STABILITY SLEEVES: Brand: ENDOPATH XCEL

## (undated) DEVICE — AIRWY 90MM NO9

## (undated) DEVICE — SUT SILK 2/0 PS 18IN 1588H

## (undated) DEVICE — SPNG GZ WOVN 4X4IN 12PLY 10/BX STRL

## (undated) DEVICE — MEDI-VAC NON-CONDUCTIVE SUCTION TUBING: Brand: CARDINAL HEALTH

## (undated) DEVICE — UNDERGLV SURG BIOGEL INDICAT PF 61/2 GRN

## (undated) DEVICE — [HIGH FLOW INSUFFLATOR,  DO NOT USE IF PACKAGE IS DAMAGED,  KEEP DRY,  KEEP AWAY FROM SUNLIGHT,  PROTECT FROM HEAT AND RADIOACTIVE SOURCES.]: Brand: PNEUMOSURE

## (undated) DEVICE — DRAPE,TOP,102X53,STERILE: Brand: MEDLINE

## (undated) DEVICE — DEV WARMR SCPE LIQUIDSCOPEWARMOR 2BUTN SHT NON/DEHP/ALC STRL

## (undated) DEVICE — MEDI-VAC YANKAUER SUCTION HANDLE W/BULBOUS TIP: Brand: CARDINAL HEALTH

## (undated) DEVICE — UNDERCAST PADDING: Brand: DEROYAL

## (undated) DEVICE — PK LAP HYST 10

## (undated) DEVICE — ADHS LIQ MASTISOL 2/3ML

## (undated) DEVICE — SCRB SURG BACTOSHIELD CHG 4PCT 4OZ

## (undated) DEVICE — GLV SURG SIGNATURE TOUCH PF LTX 6 STRL

## (undated) DEVICE — INTENDED FOR TISSUE SEPARATION, AND OTHER PROCEDURES THAT REQUIRE A SHARP SURGICAL BLADE TO PUNCTURE OR CUT.: Brand: BARD-PARKER ® SAFETYLOCK CARBON RIB-BACK BLADES

## (undated) DEVICE — 3M™ IOBAN™ 2 ANTIMICROBIAL INCISE DRAPE 6650EZ: Brand: IOBAN™ 2

## (undated) DEVICE — LAPAROSCOPIC SMOKE FILTRATION SYSTEM: Brand: PALL LAPAROSHIELD® PLUS LAPAROSCOPIC SMOKE FILTRATION SYSTEM

## (undated) DEVICE — SUTURING DEVICE: Brand: ENDO STITCH

## (undated) DEVICE — BNDG ELAS W/CLIP 6IN 10YD LF STRL

## (undated) DEVICE — DRSNG GZ CURAD XEROFORM NONADHR OVERWRAP 5X9IN

## (undated) DEVICE — DELFI MATCHING LIMB PROTECTION SLEEVES (MLPS) HELP PROTECT THE PATIENT’S LIMB FROM POSSIBLE WRINKLING, PINCHING AND SHEARING OF SKIN AND SOFT TISSUES OF THE LIMB.EACH DELFI MATCHING LIMB PROTECTION SLEEVE IS INTENDED FOR USE WITH A SPECIFIC DELFI TOURNIQUET CUFF. THIS SLEEVE IS SPECIFICALLY FOR THIGH.: Brand: MATCHING LIMB PROTECTION SLEEVES - VARI-FIT

## (undated) DEVICE — PK EXTREM LOWR 10

## (undated) DEVICE — VIOLET POLYDIOXANONE POLYMER, SYNTHETIC ABSORBABLE SUTURE CLIPS: Brand: LAPRA-TY

## (undated) DEVICE — ABSORBABLE SINGLE STITCH RELOAD: Brand: POLYSORB

## (undated) DEVICE — HANDPIECE SET WITH HIGH FLOW TIP AND SUCTION TUBE: Brand: INTERPULSE

## (undated) DEVICE — CYSTO/BLADDER IRRIGATION SET, REGULATING CLAMP

## (undated) DEVICE — 3M™ STERI-STRIP™ REINFORCED ADHESIVE SKIN CLOSURES, R1547, 1/2 IN X 4 IN (12 MM X 100 MM), 6 STRIPS/ENVELOPE: Brand: 3M™ STERI-STRIP™

## (undated) DEVICE — CONTAINER,SPECIMEN,OR STERILE,4OZ: Brand: MEDLINE

## (undated) DEVICE — HDRST POSTIN FM CRDL TRACH SLOT 9X8X4IN

## (undated) DEVICE — SHOE, POST-OPERATIVE: Brand: DEROYAL

## (undated) DEVICE — JACKSON-PRATT 100CC BULB RESERVOIR: Brand: CARDINAL HEALTH

## (undated) DEVICE — BNDG ADHS PLSTC 1X3IN LF

## (undated) DEVICE — PAD CAST SOF ROL NS 6IN

## (undated) DEVICE — SUT VIC 0 TIES 18IN J912G

## (undated) DEVICE — APPL CHLORAPREP TINTED 26ML TEAL

## (undated) DEVICE — CVR HNDL LT SURG ACCSSRY BLU STRL

## (undated) DEVICE — DISPOSABLE TOURNIQUET CUFF SINGLE BLADDER, DUAL PORT AND QUICK CONNECT CONNECTOR: Brand: COLOR CUFF

## (undated) DEVICE — ENDOPATH XCEL UNIVERSAL TROCAR STABLILITY SLEEVES: Brand: ENDOPATH XCEL

## (undated) DEVICE — SUT MNCRYL 2/0 SH 27IN UD MCP417H

## (undated) DEVICE — MANIP UTER RUMI 2 KOH EFFICIENT SS CP 3.5CM

## (undated) DEVICE — SYR LUERLOK 50ML

## (undated) DEVICE — TRENDELENBURG WINGPAD POSITIONING KIT DELUXE - WITHOUT BODY STRAP: Brand: SOULE MEDICAL

## (undated) DEVICE — APPL CHLORAPREP W/TINT 26ML BLU

## (undated) DEVICE — GLV SURG SIGNATURE TOUCH PF LTX 6.5 STRL BX/50

## (undated) DEVICE — PK CHST BRST 10

## (undated) DEVICE — Device

## (undated) DEVICE — ANTIBACTERIAL UNDYED BRAIDED (POLYGLACTIN 910), SYNTHETIC ABSORBABLE SUTURE: Brand: COATED VICRYL

## (undated) DEVICE — MANIP UTER RUMI TP 6.7MMX8CM BLU

## (undated) DEVICE — LAP PORT CLOSURE GUIDES 5MM AND 10/12MM: Brand: LAP PORT CLOSURE GUIDES 5MM AND 10/12MM

## (undated) DEVICE — SUT MNCRYL PLS ANTIB UD 3/0 PS2 27IN

## (undated) DEVICE — BNDG ELAS ELITE V/CLOSE 4IN 5YD LF STRL

## (undated) DEVICE — BLAKE SILICONE DRAIN, 10 FR ROUND, HUBLESS WITH 1/8" TROCAR: Brand: BLAKE

## (undated) DEVICE — SUT MNCRYL PLS ANTIB UD 4/0 PS2 18IN

## (undated) DEVICE — GLV SURG SIGNATURE TOUCH PF LTX 7 STRL

## (undated) DEVICE — COVER,LIGHT HANDLE,FLX,1/PK: Brand: MEDLINE INDUSTRIES, INC.

## (undated) DEVICE — APPL COTN TP PLSTC 6IN STRL LF PK/2

## (undated) DEVICE — HARMONIC ACE +7 LAPAROSCOPIC SHEARS ADVANCED HEMOSTASIS 5MM DIAMETER 36CM SHAFT LENGTH  FOR USE WITH GRAY HAND PIECE ONLY: Brand: HARMONIC ACE

## (undated) DEVICE — DRSNG ADAPTIC 3X8